# Patient Record
Sex: FEMALE | URBAN - METROPOLITAN AREA
[De-identification: names, ages, dates, MRNs, and addresses within clinical notes are randomized per-mention and may not be internally consistent; named-entity substitution may affect disease eponyms.]

---

## 2021-05-15 ENCOUNTER — HISTORICAL (OUTPATIENT)
Dept: ADMINISTRATIVE | Facility: HOSPITAL | Age: 81
End: 2021-05-15

## 2021-05-15 LAB
ABS NEUT (OLG): 2.35 X10(3)/MCL (ref 2.1–9.2)
ALBUMIN SERPL-MCNC: 3.1 GM/DL (ref 3.4–4.8)
ALBUMIN/GLOB SERPL: 1.1 RATIO (ref 1.1–2)
ALP SERPL-CCNC: 101 UNIT/L (ref 40–150)
ALT SERPL-CCNC: 13 UNIT/L (ref 0–55)
AST SERPL-CCNC: 26 UNIT/L (ref 5–34)
BASOPHILS # BLD AUTO: 0.02 X10(3)/MCL (ref 0–0.2)
BASOPHILS NFR BLD AUTO: 0.5 % (ref 0–1)
BILIRUB SERPL-MCNC: 0.5 MG/DL (ref 0.2–1.2)
BILIRUBIN DIRECT+TOT PNL SERPL-MCNC: 0.2 MG/DL (ref 0–0.5)
BILIRUBIN DIRECT+TOT PNL SERPL-MCNC: 0.3 MG/DL (ref 0–0.8)
BUN SERPL-MCNC: 16.5 MG/DL (ref 9.8–20.1)
CALCIUM SERPL-MCNC: 9.4 MG/DL (ref 8.4–10.2)
CHLORIDE SERPL-SCNC: 108 MMOL/L (ref 98–107)
CHOLEST SERPL-MCNC: 162 MG/DL
CHOLEST/HDLC SERPL: 3 {RATIO} (ref 0–5)
CO2 SERPL-SCNC: 26 MMOL/L (ref 23–31)
CREAT SERPL-MCNC: 0.81 MG/DL (ref 0.57–1.11)
EOSINOPHIL # BLD AUTO: 0.05 X10(3)/MCL (ref 0–0.9)
EOSINOPHIL NFR BLD AUTO: 1.2 % (ref 0–6.4)
ERYTHROCYTE [DISTWIDTH] IN BLOOD BY AUTOMATED COUNT: 13.8 % (ref 11.5–17)
GLOBULIN SER-MCNC: 2.9 GM/DL (ref 2.4–3.5)
GLUCOSE SERPL-MCNC: 66 MG/DL (ref 82–115)
HCT VFR BLD AUTO: 35.1 % (ref 37–47)
HDLC SERPL-MCNC: 52 MG/DL (ref 40–60)
HGB BLD-MCNC: 11.1 GM/DL (ref 12–16)
IMM GRANULOCYTES # BLD AUTO: 0.01 10*3/UL (ref 0–0.02)
IMM GRANULOCYTES NFR BLD AUTO: 0.2 % (ref 0–0.43)
LDLC SERPL CALC-MCNC: 96 MG/DL (ref 50–140)
LYMPHOCYTES # BLD AUTO: 1.35 X10(3)/MCL (ref 0.6–4.6)
LYMPHOCYTES NFR BLD AUTO: 32.2 % (ref 16–44)
MCH RBC QN AUTO: 27.7 PG (ref 27–31)
MCHC RBC AUTO-ENTMCNC: 31.6 GM/DL (ref 33–36)
MCV RBC AUTO: 87.5 FL (ref 80–94)
MONOCYTES # BLD AUTO: 0.41 X10(3)/MCL (ref 0.1–1.3)
MONOCYTES NFR BLD AUTO: 9.8 % (ref 4–12.1)
NEUTROPHILS # BLD AUTO: 2.35 X10(3)/MCL (ref 2.1–9.2)
NEUTROPHILS NFR BLD AUTO: 56.1 % (ref 43–73)
NRBC BLD AUTO-RTO: 0 % (ref 0–0.2)
PLATELET # BLD AUTO: 232 X10(3)/MCL (ref 130–400)
PMV BLD AUTO: 11 FL (ref 7.4–10.4)
POTASSIUM SERPL-SCNC: 4.3 MMOL/L (ref 3.5–5.1)
PREALB SERPL-MCNC: 15 MG/DL (ref 14–37)
PROT SERPL-MCNC: 6 GM/DL (ref 5.8–7.6)
RBC # BLD AUTO: 4.01 X10(6)/MCL (ref 4.2–5.4)
SODIUM SERPL-SCNC: 143 MMOL/L (ref 136–145)
TRIGL SERPL-MCNC: 68 MG/DL (ref 0–150)
VLDLC SERPL CALC-MCNC: 14 MG/DL
WBC # SPEC AUTO: 4.2 X10(3)/MCL (ref 4.5–11.5)

## 2021-11-18 ENCOUNTER — HISTORICAL (OUTPATIENT)
Dept: ADMINISTRATIVE | Facility: HOSPITAL | Age: 81
End: 2021-11-18

## 2021-11-18 LAB
ABS NEUT (OLG): 2.32 X10(3)/MCL (ref 2.1–9.2)
ALBUMIN SERPL-MCNC: 3 GM/DL (ref 3.4–4.8)
ALBUMIN/GLOB SERPL: 1.2 RATIO (ref 1.1–2)
ALP SERPL-CCNC: 82 UNIT/L (ref 40–150)
ALT SERPL-CCNC: 10 UNIT/L (ref 0–55)
AST SERPL-CCNC: 17 UNIT/L (ref 5–34)
BASOPHILS # BLD AUTO: 0.03 X10(3)/MCL (ref 0–0.2)
BASOPHILS NFR BLD AUTO: 0.7 % (ref 0–1)
BILIRUB SERPL-MCNC: 0.3 MG/DL (ref 0.2–1.2)
BILIRUBIN DIRECT+TOT PNL SERPL-MCNC: 0.1 MG/DL (ref 0–0.5)
BILIRUBIN DIRECT+TOT PNL SERPL-MCNC: 0.2 MG/DL (ref 0–0.8)
BUN SERPL-MCNC: 20.4 MG/DL (ref 9.8–20.1)
CALCIUM SERPL-MCNC: 9 MG/DL (ref 8.4–10.2)
CHLORIDE SERPL-SCNC: 108 MMOL/L (ref 98–107)
CHOLEST SERPL-MCNC: 131 MG/DL
CHOLEST/HDLC SERPL: 3 {RATIO} (ref 0–5)
CO2 SERPL-SCNC: 26 MMOL/L (ref 23–31)
CREAT SERPL-MCNC: 0.81 MG/DL (ref 0.57–1.11)
EOSINOPHIL # BLD AUTO: 0.12 X10(3)/MCL (ref 0–0.9)
EOSINOPHIL NFR BLD AUTO: 2.7 % (ref 0–6.4)
ERYTHROCYTE [DISTWIDTH] IN BLOOD BY AUTOMATED COUNT: 14.1 % (ref 11.5–17)
GLOBULIN SER-MCNC: 2.6 GM/DL (ref 2.4–3.5)
GLUCOSE SERPL-MCNC: 68 MG/DL (ref 82–115)
HCT VFR BLD AUTO: 33 % (ref 37–47)
HDLC SERPL-MCNC: 52 MG/DL (ref 40–60)
HGB BLD-MCNC: 10.6 GM/DL (ref 12–16)
IMM GRANULOCYTES # BLD AUTO: 0 10*3/UL (ref 0–0.02)
IMM GRANULOCYTES NFR BLD AUTO: 0 % (ref 0–0.43)
LDLC SERPL CALC-MCNC: 71 MG/DL (ref 50–140)
LYMPHOCYTES # BLD AUTO: 1.58 X10(3)/MCL (ref 0.6–4.6)
LYMPHOCYTES NFR BLD AUTO: 35.6 % (ref 16–44)
MCH RBC QN AUTO: 27.1 PG (ref 27–31)
MCHC RBC AUTO-ENTMCNC: 32.1 GM/DL (ref 33–36)
MCV RBC AUTO: 84.4 FL (ref 80–94)
MONOCYTES # BLD AUTO: 0.39 X10(3)/MCL (ref 0.1–1.3)
MONOCYTES NFR BLD AUTO: 8.8 % (ref 4–12.1)
NEUTROPHILS # BLD AUTO: 2.32 X10(3)/MCL (ref 2.1–9.2)
NEUTROPHILS NFR BLD AUTO: 52.2 % (ref 43–73)
NRBC BLD AUTO-RTO: 0 % (ref 0–0.2)
PLATELET # BLD AUTO: 172 X10(3)/MCL (ref 130–400)
PMV BLD AUTO: 10.3 FL (ref 7.4–10.4)
POTASSIUM SERPL-SCNC: 4.2 MMOL/L (ref 3.5–5.1)
PROT SERPL-MCNC: 5.6 GM/DL (ref 5.8–7.6)
RBC # BLD AUTO: 3.91 X10(6)/MCL (ref 4.2–5.4)
SODIUM SERPL-SCNC: 143 MMOL/L (ref 136–145)
TRIGL SERPL-MCNC: 39 MG/DL (ref 0–150)
VLDLC SERPL CALC-MCNC: 8 MG/DL
WBC # SPEC AUTO: 4.4 X10(3)/MCL (ref 4.5–11.5)

## 2022-05-13 ENCOUNTER — LAB REQUISITION (OUTPATIENT)
Dept: LAB | Facility: HOSPITAL | Age: 82
End: 2022-05-13
Payer: MEDICARE

## 2022-05-13 DIAGNOSIS — I10 ESSENTIAL (PRIMARY) HYPERTENSION: ICD-10-CM

## 2022-05-13 DIAGNOSIS — E44.1 MILD PROTEIN-CALORIE MALNUTRITION: ICD-10-CM

## 2022-05-13 DIAGNOSIS — R63.0 ANOREXIA: ICD-10-CM

## 2022-05-13 DIAGNOSIS — R13.10 DYSPHAGIA, UNSPECIFIED: ICD-10-CM

## 2022-05-13 LAB
ALBUMIN SERPL-MCNC: 3.2 GM/DL (ref 3.4–4.8)
ALBUMIN/GLOB SERPL: 1.1 RATIO (ref 1.1–2)
ALP SERPL-CCNC: 92 UNIT/L (ref 40–150)
ALT SERPL-CCNC: 7 UNIT/L (ref 0–55)
AST SERPL-CCNC: 18 UNIT/L (ref 5–34)
BASOPHILS # BLD AUTO: 0.02 X10(3)/MCL (ref 0–0.2)
BASOPHILS NFR BLD AUTO: 0.5 %
BILIRUBIN DIRECT+TOT PNL SERPL-MCNC: 0.3 MG/DL
BUN SERPL-MCNC: 22.3 MG/DL (ref 9.8–20.1)
CALCIUM SERPL-MCNC: 9.3 MG/DL (ref 8.4–10.2)
CHLORIDE SERPL-SCNC: 108 MMOL/L (ref 98–107)
CHOLEST SERPL-MCNC: 142 MG/DL
CHOLEST/HDLC SERPL: 3 {RATIO} (ref 0–5)
CO2 SERPL-SCNC: 26 MMOL/L (ref 23–31)
CREAT SERPL-MCNC: 0.82 MG/DL (ref 0.55–1.02)
EOSINOPHIL # BLD AUTO: 0.13 X10(3)/MCL (ref 0–0.9)
EOSINOPHIL NFR BLD AUTO: 3 %
ERYTHROCYTE [DISTWIDTH] IN BLOOD BY AUTOMATED COUNT: 13.9 % (ref 11.5–17)
GLOBULIN SER-MCNC: 2.9 GM/DL (ref 2.4–3.5)
GLUCOSE SERPL-MCNC: 68 MG/DL (ref 82–115)
HCT VFR BLD AUTO: 36.5 % (ref 37–47)
HDLC SERPL-MCNC: 54 MG/DL (ref 35–60)
HGB BLD-MCNC: 11.4 GM/DL (ref 12–16)
IMM GRANULOCYTES # BLD AUTO: 0.01 X10(3)/MCL (ref 0–0.02)
IMM GRANULOCYTES NFR BLD AUTO: 0.2 % (ref 0–0.43)
LDLC SERPL CALC-MCNC: 78 MG/DL (ref 50–140)
LYMPHOCYTES # BLD AUTO: 1.38 X10(3)/MCL (ref 0.6–4.6)
LYMPHOCYTES NFR BLD AUTO: 31.6 %
MCH RBC QN AUTO: 27.7 PG (ref 27–31)
MCHC RBC AUTO-ENTMCNC: 31.2 MG/DL (ref 33–36)
MCV RBC AUTO: 88.6 FL (ref 80–94)
MONOCYTES # BLD AUTO: 0.51 X10(3)/MCL (ref 0.1–1.3)
MONOCYTES NFR BLD AUTO: 11.7 %
NEUTROPHILS # BLD AUTO: 2.3 X10(3)/MCL (ref 2.1–9.2)
NEUTROPHILS NFR BLD AUTO: 53 %
NRBC BLD AUTO-RTO: 0 %
PLATELET # BLD AUTO: 188 X10(3)/MCL (ref 130–400)
PMV BLD AUTO: 10.6 FL (ref 9.4–12.4)
POTASSIUM SERPL-SCNC: 4.3 MMOL/L (ref 3.5–5.1)
PREALB SERPL-MCNC: 18.2 MG/DL (ref 14–37)
PROT SERPL-MCNC: 6.1 GM/DL (ref 5.8–7.6)
RBC # BLD AUTO: 4.12 X10(6)/MCL (ref 4.2–5.4)
SODIUM SERPL-SCNC: 145 MMOL/L (ref 136–145)
TRIGL SERPL-MCNC: 50 MG/DL (ref 37–140)
VLDLC SERPL CALC-MCNC: 10 MG/DL
WBC # SPEC AUTO: 4.4 X10(3)/MCL (ref 4.5–11.5)

## 2022-05-13 PROCEDURE — 80053 COMPREHEN METABOLIC PANEL: CPT | Performed by: FAMILY MEDICINE

## 2022-05-13 PROCEDURE — 80061 LIPID PANEL: CPT | Performed by: FAMILY MEDICINE

## 2022-05-13 PROCEDURE — 84134 ASSAY OF PREALBUMIN: CPT | Performed by: FAMILY MEDICINE

## 2022-05-13 PROCEDURE — 85025 COMPLETE CBC W/AUTO DIFF WBC: CPT | Performed by: FAMILY MEDICINE

## 2022-11-01 ENCOUNTER — LAB REQUISITION (OUTPATIENT)
Dept: LAB | Facility: HOSPITAL | Age: 82
End: 2022-11-01
Payer: MEDICARE

## 2022-11-01 DIAGNOSIS — I10 ESSENTIAL (PRIMARY) HYPERTENSION: ICD-10-CM

## 2022-11-01 LAB
ALBUMIN SERPL-MCNC: 3.1 GM/DL (ref 3.4–4.8)
ALBUMIN/GLOB SERPL: 1 RATIO (ref 1.1–2)
ALP SERPL-CCNC: 102 UNIT/L (ref 40–150)
ALT SERPL-CCNC: 9 UNIT/L (ref 0–55)
AST SERPL-CCNC: 17 UNIT/L (ref 5–34)
BASOPHILS # BLD AUTO: 0.03 X10(3)/MCL (ref 0–0.2)
BASOPHILS NFR BLD AUTO: 0.6 %
BILIRUBIN DIRECT+TOT PNL SERPL-MCNC: 0.2 MG/DL
BUN SERPL-MCNC: 16.9 MG/DL (ref 9.8–20.1)
CALCIUM SERPL-MCNC: 9 MG/DL (ref 8.4–10.2)
CHLORIDE SERPL-SCNC: 110 MMOL/L (ref 98–107)
CHOLEST SERPL-MCNC: 130 MG/DL
CHOLEST/HDLC SERPL: 3 {RATIO} (ref 0–5)
CO2 SERPL-SCNC: 28 MMOL/L (ref 23–31)
CREAT SERPL-MCNC: 0.84 MG/DL (ref 0.55–1.02)
EOSINOPHIL # BLD AUTO: 0.14 X10(3)/MCL (ref 0–0.9)
EOSINOPHIL NFR BLD AUTO: 2.7 %
ERYTHROCYTE [DISTWIDTH] IN BLOOD BY AUTOMATED COUNT: 13.4 % (ref 11.5–17)
GFR SERPLBLD CREATININE-BSD FMLA CKD-EPI: >60 MLS/MIN/1.73/M2
GLOBULIN SER-MCNC: 3 GM/DL (ref 2.4–3.5)
GLUCOSE SERPL-MCNC: 69 MG/DL (ref 82–115)
HCT VFR BLD AUTO: 35.4 % (ref 37–47)
HDLC SERPL-MCNC: 48 MG/DL (ref 35–60)
HGB BLD-MCNC: 11.1 GM/DL (ref 12–16)
IMM GRANULOCYTES # BLD AUTO: 0.01 X10(3)/MCL (ref 0–0.04)
IMM GRANULOCYTES NFR BLD AUTO: 0.2 %
LDLC SERPL CALC-MCNC: 73 MG/DL (ref 50–140)
LYMPHOCYTES # BLD AUTO: 1.6 X10(3)/MCL (ref 0.6–4.6)
LYMPHOCYTES NFR BLD AUTO: 31.3 %
MCH RBC QN AUTO: 27.8 PG (ref 27–31)
MCHC RBC AUTO-ENTMCNC: 31.4 MG/DL (ref 33–36)
MCV RBC AUTO: 88.5 FL (ref 80–94)
MONOCYTES # BLD AUTO: 0.59 X10(3)/MCL (ref 0.1–1.3)
MONOCYTES NFR BLD AUTO: 11.5 %
NEUTROPHILS # BLD AUTO: 2.8 X10(3)/MCL (ref 2.1–9.2)
NEUTROPHILS NFR BLD AUTO: 53.7 %
NRBC BLD AUTO-RTO: 0 %
PLATELET # BLD AUTO: 229 X10(3)/MCL (ref 130–400)
PMV BLD AUTO: 10.6 FL (ref 7.4–10.4)
POTASSIUM SERPL-SCNC: 4.4 MMOL/L (ref 3.5–5.1)
PROT SERPL-MCNC: 6.1 GM/DL (ref 5.8–7.6)
RBC # BLD AUTO: 4 X10(6)/MCL (ref 4.2–5.4)
SODIUM SERPL-SCNC: 145 MMOL/L (ref 136–145)
TRIGL SERPL-MCNC: 44 MG/DL (ref 37–140)
VLDLC SERPL CALC-MCNC: 9 MG/DL
WBC # SPEC AUTO: 5.1 X10(3)/MCL (ref 4.5–11.5)

## 2022-11-01 PROCEDURE — 85025 COMPLETE CBC W/AUTO DIFF WBC: CPT | Performed by: INTERNAL MEDICINE

## 2022-11-01 PROCEDURE — 80061 LIPID PANEL: CPT | Performed by: INTERNAL MEDICINE

## 2022-11-01 PROCEDURE — 80053 COMPREHEN METABOLIC PANEL: CPT | Performed by: INTERNAL MEDICINE

## 2023-05-01 ENCOUNTER — LAB REQUISITION (OUTPATIENT)
Dept: LAB | Facility: HOSPITAL | Age: 83
End: 2023-05-01
Payer: MEDICARE

## 2023-05-01 DIAGNOSIS — E78.5 HYPERLIPIDEMIA, UNSPECIFIED: ICD-10-CM

## 2023-05-01 DIAGNOSIS — I10 ESSENTIAL (PRIMARY) HYPERTENSION: ICD-10-CM

## 2023-05-01 LAB
ALBUMIN SERPL-MCNC: 3.2 G/DL (ref 3.4–4.8)
ALBUMIN/GLOB SERPL: 1 RATIO (ref 1.1–2)
ALP SERPL-CCNC: 97 UNIT/L (ref 40–150)
ALT SERPL-CCNC: 18 UNIT/L (ref 0–55)
AST SERPL-CCNC: 36 UNIT/L (ref 5–34)
BASOPHILS # BLD AUTO: 0.02 X10(3)/MCL (ref 0–0.2)
BASOPHILS NFR BLD AUTO: 0.7 %
BILIRUBIN DIRECT+TOT PNL SERPL-MCNC: 0.2 MG/DL
BUN SERPL-MCNC: 20.6 MG/DL (ref 9.8–20.1)
CALCIUM SERPL-MCNC: 9 MG/DL (ref 8.4–10.2)
CHLORIDE SERPL-SCNC: 110 MMOL/L (ref 98–107)
CHOLEST SERPL-MCNC: 157 MG/DL
CHOLEST/HDLC SERPL: 3 {RATIO} (ref 0–5)
CO2 SERPL-SCNC: 27 MMOL/L (ref 23–31)
CREAT SERPL-MCNC: 0.89 MG/DL (ref 0.55–1.02)
EOSINOPHIL # BLD AUTO: 0.06 X10(3)/MCL (ref 0–0.9)
EOSINOPHIL NFR BLD AUTO: 2 %
ERYTHROCYTE [DISTWIDTH] IN BLOOD BY AUTOMATED COUNT: 14.3 % (ref 11.5–17)
GFR SERPLBLD CREATININE-BSD FMLA CKD-EPI: >60 MLS/MIN/1.73/M2
GLOBULIN SER-MCNC: 3.3 GM/DL (ref 2.4–3.5)
GLUCOSE SERPL-MCNC: 69 MG/DL (ref 82–115)
HCT VFR BLD AUTO: 39.4 % (ref 37–47)
HDLC SERPL-MCNC: 51 MG/DL (ref 35–60)
HGB BLD-MCNC: 12.1 G/DL (ref 12–16)
IMM GRANULOCYTES # BLD AUTO: 0.01 X10(3)/MCL (ref 0–0.04)
IMM GRANULOCYTES NFR BLD AUTO: 0.3 %
LDLC SERPL CALC-MCNC: 92 MG/DL (ref 50–140)
LYMPHOCYTES # BLD AUTO: 1.24 X10(3)/MCL (ref 0.6–4.6)
LYMPHOCYTES NFR BLD AUTO: 41.3 %
MCH RBC QN AUTO: 27.3 PG (ref 27–31)
MCHC RBC AUTO-ENTMCNC: 30.7 G/DL (ref 33–36)
MCV RBC AUTO: 88.7 FL (ref 80–94)
MONOCYTES # BLD AUTO: 0.38 X10(3)/MCL (ref 0.1–1.3)
MONOCYTES NFR BLD AUTO: 12.7 %
NEUTROPHILS # BLD AUTO: 1.29 X10(3)/MCL (ref 2.1–9.2)
NEUTROPHILS NFR BLD AUTO: 43 %
NRBC BLD AUTO-RTO: 0 %
PLATELET # BLD AUTO: 208 X10(3)/MCL (ref 130–400)
PMV BLD AUTO: 11 FL (ref 7.4–10.4)
POTASSIUM SERPL-SCNC: 4 MMOL/L (ref 3.5–5.1)
PROT SERPL-MCNC: 6.5 GM/DL (ref 5.8–7.6)
RBC # BLD AUTO: 4.44 X10(6)/MCL (ref 4.2–5.4)
SODIUM SERPL-SCNC: 147 MMOL/L (ref 136–145)
TRIGL SERPL-MCNC: 72 MG/DL (ref 37–140)
VLDLC SERPL CALC-MCNC: 14 MG/DL
WBC # SPEC AUTO: 3 X10(3)/MCL (ref 4.5–11.5)

## 2023-05-01 PROCEDURE — 80053 COMPREHEN METABOLIC PANEL: CPT | Performed by: INTERNAL MEDICINE

## 2023-05-01 PROCEDURE — 80061 LIPID PANEL: CPT | Performed by: INTERNAL MEDICINE

## 2023-05-01 PROCEDURE — 85025 COMPLETE CBC W/AUTO DIFF WBC: CPT | Performed by: INTERNAL MEDICINE

## 2023-11-02 ENCOUNTER — LAB REQUISITION (OUTPATIENT)
Dept: LAB | Facility: HOSPITAL | Age: 83
End: 2023-11-02
Payer: MEDICARE

## 2023-11-02 DIAGNOSIS — I10 ESSENTIAL (PRIMARY) HYPERTENSION: ICD-10-CM

## 2023-11-02 LAB
ALBUMIN SERPL-MCNC: 3.3 G/DL (ref 3.4–4.8)
ALBUMIN/GLOB SERPL: 1 RATIO (ref 1.1–2)
ALP SERPL-CCNC: 106 UNIT/L (ref 40–150)
ALT SERPL-CCNC: 10 UNIT/L (ref 0–55)
AST SERPL-CCNC: 21 UNIT/L (ref 5–34)
BASOPHILS # BLD AUTO: 0.04 X10(3)/MCL
BASOPHILS NFR BLD AUTO: 0.8 %
BILIRUB SERPL-MCNC: 0.2 MG/DL
BUN SERPL-MCNC: 18.9 MG/DL (ref 9.8–20.1)
CALCIUM SERPL-MCNC: 8.9 MG/DL (ref 8.4–10.2)
CHLORIDE SERPL-SCNC: 111 MMOL/L (ref 98–107)
CHOLEST SERPL-MCNC: 144 MG/DL
CHOLEST/HDLC SERPL: 3 {RATIO} (ref 0–5)
CO2 SERPL-SCNC: 25 MMOL/L (ref 23–31)
CREAT SERPL-MCNC: 1.03 MG/DL (ref 0.55–1.02)
EOSINOPHIL # BLD AUTO: 0.17 X10(3)/MCL (ref 0–0.9)
EOSINOPHIL NFR BLD AUTO: 3.3 %
ERYTHROCYTE [DISTWIDTH] IN BLOOD BY AUTOMATED COUNT: 13.6 % (ref 11.5–17)
GFR SERPLBLD CREATININE-BSD FMLA CKD-EPI: 55 MLS/MIN/1.73/M2
GLOBULIN SER-MCNC: 3.2 GM/DL (ref 2.4–3.5)
GLUCOSE SERPL-MCNC: 59 MG/DL (ref 82–115)
HCT VFR BLD AUTO: 37.8 % (ref 37–47)
HDLC SERPL-MCNC: 42 MG/DL (ref 35–60)
HGB BLD-MCNC: 12 G/DL (ref 12–16)
IMM GRANULOCYTES # BLD AUTO: 0 X10(3)/MCL (ref 0–0.04)
IMM GRANULOCYTES NFR BLD AUTO: 0 %
LDLC SERPL CALC-MCNC: 79 MG/DL (ref 50–140)
LYMPHOCYTES # BLD AUTO: 1.7 X10(3)/MCL (ref 0.6–4.6)
LYMPHOCYTES NFR BLD AUTO: 32.8 %
MCH RBC QN AUTO: 28.4 PG (ref 27–31)
MCHC RBC AUTO-ENTMCNC: 31.7 G/DL (ref 33–36)
MCV RBC AUTO: 89.6 FL (ref 80–94)
MONOCYTES # BLD AUTO: 0.45 X10(3)/MCL (ref 0.1–1.3)
MONOCYTES NFR BLD AUTO: 8.7 %
NEUTROPHILS # BLD AUTO: 2.83 X10(3)/MCL (ref 2.1–9.2)
NEUTROPHILS NFR BLD AUTO: 54.4 %
NRBC BLD AUTO-RTO: 0 %
PLATELET # BLD AUTO: 207 X10(3)/MCL (ref 130–400)
PMV BLD AUTO: 10.9 FL (ref 7.4–10.4)
POTASSIUM SERPL-SCNC: 4.7 MMOL/L (ref 3.5–5.1)
PROT SERPL-MCNC: 6.5 GM/DL (ref 5.8–7.6)
RBC # BLD AUTO: 4.22 X10(6)/MCL (ref 4.2–5.4)
SODIUM SERPL-SCNC: 144 MMOL/L (ref 136–145)
TRIGL SERPL-MCNC: 116 MG/DL (ref 37–140)
VLDLC SERPL CALC-MCNC: 23 MG/DL
WBC # SPEC AUTO: 5.19 X10(3)/MCL (ref 4.5–11.5)

## 2023-11-02 PROCEDURE — 85025 COMPLETE CBC W/AUTO DIFF WBC: CPT | Performed by: INTERNAL MEDICINE

## 2023-11-02 PROCEDURE — 80053 COMPREHEN METABOLIC PANEL: CPT | Performed by: INTERNAL MEDICINE

## 2023-11-02 PROCEDURE — 80061 LIPID PANEL: CPT | Performed by: INTERNAL MEDICINE

## 2024-05-01 ENCOUNTER — LAB REQUISITION (OUTPATIENT)
Dept: LAB | Facility: HOSPITAL | Age: 84
End: 2024-05-01
Payer: MEDICARE

## 2024-05-01 DIAGNOSIS — E78.5 HYPERLIPIDEMIA, UNSPECIFIED: ICD-10-CM

## 2024-05-01 DIAGNOSIS — I10 ESSENTIAL (PRIMARY) HYPERTENSION: ICD-10-CM

## 2024-05-01 DIAGNOSIS — E44.1 MILD PROTEIN-CALORIE MALNUTRITION: ICD-10-CM

## 2024-05-01 LAB
ALBUMIN SERPL-MCNC: 3.2 G/DL (ref 3.4–4.8)
ALBUMIN/GLOB SERPL: 1.1 RATIO (ref 1.1–2)
ALP SERPL-CCNC: 98 UNIT/L (ref 40–150)
ALT SERPL-CCNC: 5 UNIT/L (ref 0–55)
AST SERPL-CCNC: 18 UNIT/L (ref 5–34)
BASOPHILS # BLD AUTO: 0.04 X10(3)/MCL
BASOPHILS NFR BLD AUTO: 0.8 %
BILIRUB SERPL-MCNC: 0.2 MG/DL
BUN SERPL-MCNC: 18.2 MG/DL (ref 9.8–20.1)
CALCIUM SERPL-MCNC: 8.8 MG/DL (ref 8.4–10.2)
CHLORIDE SERPL-SCNC: 109 MMOL/L (ref 98–107)
CHOLEST SERPL-MCNC: 131 MG/DL
CHOLEST/HDLC SERPL: 3 {RATIO} (ref 0–5)
CO2 SERPL-SCNC: 26 MMOL/L (ref 23–31)
CREAT SERPL-MCNC: 1.07 MG/DL (ref 0.55–1.02)
EOSINOPHIL # BLD AUTO: 0.14 X10(3)/MCL (ref 0–0.9)
EOSINOPHIL NFR BLD AUTO: 2.9 %
ERYTHROCYTE [DISTWIDTH] IN BLOOD BY AUTOMATED COUNT: 13.8 % (ref 11.5–17)
GFR SERPLBLD CREATININE-BSD FMLA CKD-EPI: 52 MLS/MIN/1.73/M2
GLOBULIN SER-MCNC: 3 GM/DL (ref 2.4–3.5)
GLUCOSE SERPL-MCNC: 64 MG/DL (ref 82–115)
HCT VFR BLD AUTO: 34.6 % (ref 37–47)
HDLC SERPL-MCNC: 51 MG/DL (ref 35–60)
HGB BLD-MCNC: 10.7 G/DL (ref 12–16)
IMM GRANULOCYTES # BLD AUTO: 0.01 X10(3)/MCL (ref 0–0.04)
IMM GRANULOCYTES NFR BLD AUTO: 0.2 %
LDLC SERPL CALC-MCNC: 67 MG/DL (ref 50–140)
LYMPHOCYTES # BLD AUTO: 1.58 X10(3)/MCL (ref 0.6–4.6)
LYMPHOCYTES NFR BLD AUTO: 32.2 %
MCH RBC QN AUTO: 27.2 PG (ref 27–31)
MCHC RBC AUTO-ENTMCNC: 30.9 G/DL (ref 33–36)
MCV RBC AUTO: 88 FL (ref 80–94)
MONOCYTES # BLD AUTO: 0.52 X10(3)/MCL (ref 0.1–1.3)
MONOCYTES NFR BLD AUTO: 10.6 %
NEUTROPHILS # BLD AUTO: 2.62 X10(3)/MCL (ref 2.1–9.2)
NEUTROPHILS NFR BLD AUTO: 53.3 %
NRBC BLD AUTO-RTO: 0 %
PLATELET # BLD AUTO: 219 X10(3)/MCL (ref 130–400)
PMV BLD AUTO: 10.9 FL (ref 7.4–10.4)
POTASSIUM SERPL-SCNC: 4.3 MMOL/L (ref 3.5–5.1)
PREALB SERPL-MCNC: 17.4 MG/DL (ref 14–37)
PROT SERPL-MCNC: 6.2 GM/DL (ref 5.8–7.6)
RBC # BLD AUTO: 3.93 X10(6)/MCL (ref 4.2–5.4)
SODIUM SERPL-SCNC: 144 MMOL/L (ref 136–145)
TRIGL SERPL-MCNC: 66 MG/DL (ref 37–140)
VLDLC SERPL CALC-MCNC: 13 MG/DL
WBC # SPEC AUTO: 4.91 X10(3)/MCL (ref 4.5–11.5)

## 2024-05-01 PROCEDURE — 80053 COMPREHEN METABOLIC PANEL: CPT | Performed by: INTERNAL MEDICINE

## 2024-05-01 PROCEDURE — 80061 LIPID PANEL: CPT | Performed by: INTERNAL MEDICINE

## 2024-05-01 PROCEDURE — 85025 COMPLETE CBC W/AUTO DIFF WBC: CPT | Performed by: INTERNAL MEDICINE

## 2024-05-01 PROCEDURE — 84134 ASSAY OF PREALBUMIN: CPT | Performed by: INTERNAL MEDICINE

## 2024-06-12 PROCEDURE — 87086 URINE CULTURE/COLONY COUNT: CPT | Performed by: NURSE PRACTITIONER

## 2024-06-12 PROCEDURE — 87077 CULTURE AEROBIC IDENTIFY: CPT | Mod: 91 | Performed by: NURSE PRACTITIONER

## 2024-06-12 PROCEDURE — 81003 URINALYSIS AUTO W/O SCOPE: CPT | Performed by: NURSE PRACTITIONER

## 2024-06-13 ENCOUNTER — LAB REQUISITION (OUTPATIENT)
Dept: LAB | Facility: HOSPITAL | Age: 84
End: 2024-06-13
Payer: MEDICARE

## 2024-06-13 DIAGNOSIS — R41.0 DISORIENTATION, UNSPECIFIED: ICD-10-CM

## 2024-06-13 LAB
BACTERIA #/AREA URNS AUTO: ABNORMAL /HPF
BILIRUB UR QL STRIP.AUTO: NEGATIVE
CLARITY UR: ABNORMAL
COLOR UR AUTO: ABNORMAL
GLUCOSE UR QL STRIP: NEGATIVE
HGB UR QL STRIP: NEGATIVE
KETONES UR QL STRIP: NEGATIVE
LEUKOCYTE ESTERASE UR QL STRIP: ABNORMAL
NITRITE UR QL STRIP: POSITIVE
PH UR STRIP: 6 [PH]
PROT UR QL STRIP: NEGATIVE
RBC #/AREA URNS AUTO: ABNORMAL /HPF
SP GR UR STRIP.AUTO: 1.02 (ref 1–1.03)
SQUAMOUS #/AREA URNS AUTO: ABNORMAL /HPF
UROBILINOGEN UR STRIP-ACNC: 0.2
WBC #/AREA URNS AUTO: ABNORMAL /HPF

## 2024-06-16 LAB — BACTERIA UR CULT: ABNORMAL

## 2024-07-23 ENCOUNTER — LAB REQUISITION (OUTPATIENT)
Dept: LAB | Facility: HOSPITAL | Age: 84
End: 2024-07-23
Payer: MEDICARE

## 2024-07-23 DIAGNOSIS — Z02.2 ENCOUNTER FOR EXAMINATION FOR ADMISSION TO RESIDENTIAL INSTITUTION: ICD-10-CM

## 2024-07-23 LAB
ALBUMIN SERPL-MCNC: 3.1 G/DL (ref 3.4–4.8)
ALBUMIN/GLOB SERPL: 1 RATIO (ref 1.1–2)
ALP SERPL-CCNC: 84 UNIT/L (ref 40–150)
ALT SERPL-CCNC: 14 UNIT/L (ref 0–55)
ANION GAP SERPL CALC-SCNC: 11 MEQ/L
AST SERPL-CCNC: 25 UNIT/L (ref 5–34)
BASOPHILS # BLD AUTO: 0.03 X10(3)/MCL
BASOPHILS NFR BLD AUTO: 0.6 %
BILIRUB SERPL-MCNC: 0.4 MG/DL
BUN SERPL-MCNC: 20 MG/DL (ref 9.8–20.1)
CALCIUM SERPL-MCNC: 9.8 MG/DL (ref 8.4–10.2)
CHLORIDE SERPL-SCNC: 111 MMOL/L (ref 98–107)
CHOLEST SERPL-MCNC: 120 MG/DL
CHOLEST/HDLC SERPL: 3 {RATIO} (ref 0–5)
CO2 SERPL-SCNC: 26 MMOL/L (ref 23–31)
CREAT SERPL-MCNC: 1.04 MG/DL (ref 0.55–1.02)
CREAT/UREA NIT SERPL: 19
EOSINOPHIL # BLD AUTO: 0.12 X10(3)/MCL (ref 0–0.9)
EOSINOPHIL NFR BLD AUTO: 2.4 %
ERYTHROCYTE [DISTWIDTH] IN BLOOD BY AUTOMATED COUNT: 14.1 % (ref 11.5–17)
EST. AVERAGE GLUCOSE BLD GHB EST-MCNC: 108.3 MG/DL
GFR SERPLBLD CREATININE-BSD FMLA CKD-EPI: 54 ML/MIN/1.73/M2
GLOBULIN SER-MCNC: 3 GM/DL (ref 2.4–3.5)
GLUCOSE SERPL-MCNC: 76 MG/DL (ref 82–115)
HBA1C MFR BLD: 5.4 %
HCT VFR BLD AUTO: 33.9 % (ref 37–47)
HDLC SERPL-MCNC: 40 MG/DL (ref 35–60)
HGB BLD-MCNC: 10.6 G/DL (ref 12–16)
IMM GRANULOCYTES # BLD AUTO: 0.02 X10(3)/MCL (ref 0–0.04)
IMM GRANULOCYTES NFR BLD AUTO: 0.4 %
LDLC SERPL CALC-MCNC: 65 MG/DL (ref 50–140)
LYMPHOCYTES # BLD AUTO: 0.99 X10(3)/MCL (ref 0.6–4.6)
LYMPHOCYTES NFR BLD AUTO: 20 %
MCH RBC QN AUTO: 27.7 PG (ref 27–31)
MCHC RBC AUTO-ENTMCNC: 31.3 G/DL (ref 33–36)
MCV RBC AUTO: 88.7 FL (ref 80–94)
MONOCYTES # BLD AUTO: 0.42 X10(3)/MCL (ref 0.1–1.3)
MONOCYTES NFR BLD AUTO: 8.5 %
NEUTROPHILS # BLD AUTO: 3.38 X10(3)/MCL (ref 2.1–9.2)
NEUTROPHILS NFR BLD AUTO: 68.1 %
NRBC BLD AUTO-RTO: 0 %
PLATELET # BLD AUTO: 216 X10(3)/MCL (ref 130–400)
PMV BLD AUTO: 10.6 FL (ref 7.4–10.4)
POTASSIUM SERPL-SCNC: 4.4 MMOL/L (ref 3.5–5.1)
PREALB SERPL-MCNC: 13.3 MG/DL (ref 14–37)
PROT SERPL-MCNC: 6.1 GM/DL (ref 5.8–7.6)
RBC # BLD AUTO: 3.82 X10(6)/MCL (ref 4.2–5.4)
SODIUM SERPL-SCNC: 148 MMOL/L (ref 136–145)
TRIGL SERPL-MCNC: 73 MG/DL (ref 37–140)
VLDLC SERPL CALC-MCNC: 15 MG/DL
WBC # BLD AUTO: 4.96 X10(3)/MCL (ref 4.5–11.5)

## 2024-07-23 PROCEDURE — 80061 LIPID PANEL: CPT | Performed by: INTERNAL MEDICINE

## 2024-07-23 PROCEDURE — 80053 COMPREHEN METABOLIC PANEL: CPT | Performed by: INTERNAL MEDICINE

## 2024-07-23 PROCEDURE — 84134 ASSAY OF PREALBUMIN: CPT | Performed by: INTERNAL MEDICINE

## 2024-07-23 PROCEDURE — 85025 COMPLETE CBC W/AUTO DIFF WBC: CPT | Performed by: INTERNAL MEDICINE

## 2024-07-23 PROCEDURE — 83036 HEMOGLOBIN GLYCOSYLATED A1C: CPT | Performed by: INTERNAL MEDICINE

## 2024-07-26 ENCOUNTER — LAB REQUISITION (OUTPATIENT)
Dept: LAB | Facility: HOSPITAL | Age: 84
End: 2024-07-26
Payer: MEDICARE

## 2024-07-26 DIAGNOSIS — E87.0 HYPEROSMOLALITY AND HYPERNATREMIA: ICD-10-CM

## 2024-07-26 LAB
ALBUMIN SERPL-MCNC: 3.1 G/DL (ref 3.4–4.8)
ALBUMIN/GLOB SERPL: 1 RATIO (ref 1.1–2)
ALP SERPL-CCNC: 88 UNIT/L (ref 40–150)
ALT SERPL-CCNC: 14 UNIT/L (ref 0–55)
ANION GAP SERPL CALC-SCNC: 12 MEQ/L
AST SERPL-CCNC: 23 UNIT/L (ref 5–34)
BILIRUB SERPL-MCNC: 0.2 MG/DL
BUN SERPL-MCNC: 16.6 MG/DL (ref 9.8–20.1)
CALCIUM SERPL-MCNC: 9.6 MG/DL (ref 8.4–10.2)
CHLORIDE SERPL-SCNC: 110 MMOL/L (ref 98–107)
CO2 SERPL-SCNC: 26 MMOL/L (ref 23–31)
CREAT SERPL-MCNC: 1.03 MG/DL (ref 0.55–1.02)
CREAT/UREA NIT SERPL: 16
GFR SERPLBLD CREATININE-BSD FMLA CKD-EPI: 54 ML/MIN/1.73/M2
GLOBULIN SER-MCNC: 3.1 GM/DL (ref 2.4–3.5)
GLUCOSE SERPL-MCNC: 64 MG/DL (ref 82–115)
POTASSIUM SERPL-SCNC: 4.1 MMOL/L (ref 3.5–5.1)
PROT SERPL-MCNC: 6.2 GM/DL (ref 5.8–7.6)
SODIUM SERPL-SCNC: 148 MMOL/L (ref 136–145)

## 2024-07-26 PROCEDURE — 80053 COMPREHEN METABOLIC PANEL: CPT | Performed by: NURSE PRACTITIONER

## 2024-07-27 ENCOUNTER — LAB REQUISITION (OUTPATIENT)
Dept: LAB | Facility: HOSPITAL | Age: 84
End: 2024-07-27
Payer: MEDICARE

## 2024-07-27 DIAGNOSIS — E86.0 DEHYDRATION: ICD-10-CM

## 2024-07-27 LAB
ALBUMIN SERPL-MCNC: 3.1 G/DL (ref 3.4–4.8)
ALBUMIN/GLOB SERPL: 1 RATIO (ref 1.1–2)
ALP SERPL-CCNC: 100 UNIT/L (ref 40–150)
ALT SERPL-CCNC: 13 UNIT/L (ref 0–55)
ANION GAP SERPL CALC-SCNC: 10 MEQ/L
AST SERPL-CCNC: 21 UNIT/L (ref 5–34)
BILIRUB SERPL-MCNC: 0.2 MG/DL
BUN SERPL-MCNC: 10.5 MG/DL (ref 9.8–20.1)
CALCIUM SERPL-MCNC: 9.4 MG/DL (ref 8.4–10.2)
CHLORIDE SERPL-SCNC: 109 MMOL/L (ref 98–107)
CO2 SERPL-SCNC: 25 MMOL/L (ref 23–31)
CREAT SERPL-MCNC: 0.97 MG/DL (ref 0.55–1.02)
CREAT/UREA NIT SERPL: 11
GFR SERPLBLD CREATININE-BSD FMLA CKD-EPI: 58 ML/MIN/1.73/M2
GLOBULIN SER-MCNC: 3.1 GM/DL (ref 2.4–3.5)
GLUCOSE SERPL-MCNC: 153 MG/DL (ref 82–115)
POTASSIUM SERPL-SCNC: 3.9 MMOL/L (ref 3.5–5.1)
PROT SERPL-MCNC: 6.2 GM/DL (ref 5.8–7.6)
SODIUM SERPL-SCNC: 144 MMOL/L (ref 136–145)

## 2024-07-27 PROCEDURE — 80053 COMPREHEN METABOLIC PANEL: CPT | Performed by: NURSE PRACTITIONER

## 2024-07-31 ENCOUNTER — LAB REQUISITION (OUTPATIENT)
Dept: LAB | Facility: HOSPITAL | Age: 84
End: 2024-07-31
Payer: MEDICARE

## 2024-07-31 DIAGNOSIS — E86.1 HYPOVOLEMIA: ICD-10-CM

## 2024-07-31 LAB
ALBUMIN SERPL-MCNC: 2.9 G/DL (ref 3.4–4.8)
ALBUMIN/GLOB SERPL: 1 RATIO (ref 1.1–2)
ALP SERPL-CCNC: 92 UNIT/L (ref 40–150)
ALT SERPL-CCNC: 9 UNIT/L (ref 0–55)
ANION GAP SERPL CALC-SCNC: 10 MEQ/L
AST SERPL-CCNC: 17 UNIT/L (ref 5–34)
BILIRUB SERPL-MCNC: 0.2 MG/DL
BUN SERPL-MCNC: 12.8 MG/DL (ref 9.8–20.1)
CALCIUM SERPL-MCNC: 9 MG/DL (ref 8.4–10.2)
CHLORIDE SERPL-SCNC: 111 MMOL/L (ref 98–107)
CO2 SERPL-SCNC: 26 MMOL/L (ref 23–31)
CREAT SERPL-MCNC: 0.96 MG/DL (ref 0.55–1.02)
CREAT/UREA NIT SERPL: 13
GFR SERPLBLD CREATININE-BSD FMLA CKD-EPI: 59 ML/MIN/1.73/M2
GLOBULIN SER-MCNC: 2.9 GM/DL (ref 2.4–3.5)
GLUCOSE SERPL-MCNC: 78 MG/DL (ref 82–115)
POTASSIUM SERPL-SCNC: 3.9 MMOL/L (ref 3.5–5.1)
PROT SERPL-MCNC: 5.8 GM/DL (ref 5.8–7.6)
SODIUM SERPL-SCNC: 147 MMOL/L (ref 136–145)

## 2024-07-31 PROCEDURE — 80053 COMPREHEN METABOLIC PANEL: CPT | Performed by: NURSE PRACTITIONER

## 2024-08-02 ENCOUNTER — LAB REQUISITION (OUTPATIENT)
Dept: LAB | Facility: HOSPITAL | Age: 84
End: 2024-08-02
Payer: MEDICARE

## 2024-08-02 DIAGNOSIS — E86.1 HYPOVOLEMIA: ICD-10-CM

## 2024-08-02 DIAGNOSIS — E87.1 HYPO-OSMOLALITY AND HYPONATREMIA: ICD-10-CM

## 2024-08-02 LAB
ALBUMIN SERPL-MCNC: 2.9 G/DL (ref 3.4–4.8)
ALBUMIN/GLOB SERPL: 1 RATIO (ref 1.1–2)
ALP SERPL-CCNC: 90 UNIT/L (ref 40–150)
ALT SERPL-CCNC: 10 UNIT/L (ref 0–55)
ANION GAP SERPL CALC-SCNC: 12 MEQ/L
AST SERPL-CCNC: 18 UNIT/L (ref 5–34)
BILIRUB SERPL-MCNC: 0.2 MG/DL
BUN SERPL-MCNC: 10.1 MG/DL (ref 9.8–20.1)
CALCIUM SERPL-MCNC: 9 MG/DL (ref 8.4–10.2)
CHLORIDE SERPL-SCNC: 106 MMOL/L (ref 98–107)
CO2 SERPL-SCNC: 25 MMOL/L (ref 23–31)
CREAT SERPL-MCNC: 0.92 MG/DL (ref 0.55–1.02)
CREAT/UREA NIT SERPL: 11
GFR SERPLBLD CREATININE-BSD FMLA CKD-EPI: >60 ML/MIN/1.73/M2
GLOBULIN SER-MCNC: 2.8 GM/DL (ref 2.4–3.5)
GLUCOSE SERPL-MCNC: 102 MG/DL (ref 82–115)
POTASSIUM SERPL-SCNC: 3.9 MMOL/L (ref 3.5–5.1)
PROT SERPL-MCNC: 5.7 GM/DL (ref 5.8–7.6)
SODIUM SERPL-SCNC: 143 MMOL/L (ref 136–145)

## 2024-08-02 PROCEDURE — 80053 COMPREHEN METABOLIC PANEL: CPT | Performed by: NURSE PRACTITIONER

## 2024-08-21 ENCOUNTER — LAB REQUISITION (OUTPATIENT)
Dept: LAB | Facility: HOSPITAL | Age: 84
End: 2024-08-21
Payer: MEDICARE

## 2024-08-21 DIAGNOSIS — R63.0 ANOREXIA: ICD-10-CM

## 2024-08-21 LAB
ALBUMIN SERPL-MCNC: 2.3 G/DL (ref 3.4–4.8)
ALBUMIN/GLOB SERPL: 0.5 RATIO (ref 1.1–2)
ALP SERPL-CCNC: 82 UNIT/L (ref 40–150)
ALT SERPL-CCNC: 27 UNIT/L (ref 0–55)
ANION GAP SERPL CALC-SCNC: 14 MEQ/L
AST SERPL-CCNC: 29 UNIT/L (ref 5–34)
BASOPHILS # BLD AUTO: 0.02 X10(3)/MCL
BASOPHILS NFR BLD AUTO: 0.2 %
BILIRUB SERPL-MCNC: 0.3 MG/DL
BUN SERPL-MCNC: 19.7 MG/DL (ref 9.8–20.1)
CALCIUM SERPL-MCNC: 9.6 MG/DL (ref 8.4–10.2)
CHLORIDE SERPL-SCNC: 112 MMOL/L (ref 98–107)
CO2 SERPL-SCNC: 23 MMOL/L (ref 23–31)
CREAT SERPL-MCNC: 1.06 MG/DL (ref 0.55–1.02)
CREAT/UREA NIT SERPL: 19
EOSINOPHIL # BLD AUTO: 0.1 X10(3)/MCL (ref 0–0.9)
EOSINOPHIL NFR BLD AUTO: 0.9 %
ERYTHROCYTE [DISTWIDTH] IN BLOOD BY AUTOMATED COUNT: 14.1 % (ref 11.5–17)
GFR SERPLBLD CREATININE-BSD FMLA CKD-EPI: 53 ML/MIN/1.73/M2
GLOBULIN SER-MCNC: 4.9 GM/DL (ref 2.4–3.5)
GLUCOSE SERPL-MCNC: 84 MG/DL (ref 82–115)
HCT VFR BLD AUTO: 31 % (ref 37–47)
HGB BLD-MCNC: 9.8 G/DL (ref 12–16)
IMM GRANULOCYTES # BLD AUTO: 0.07 X10(3)/MCL (ref 0–0.04)
IMM GRANULOCYTES NFR BLD AUTO: 0.7 %
LYMPHOCYTES # BLD AUTO: 1.08 X10(3)/MCL (ref 0.6–4.6)
LYMPHOCYTES NFR BLD AUTO: 10.2 %
MCH RBC QN AUTO: 27.6 PG (ref 27–31)
MCHC RBC AUTO-ENTMCNC: 31.6 G/DL (ref 33–36)
MCV RBC AUTO: 87.3 FL (ref 80–94)
MONOCYTES # BLD AUTO: 0.89 X10(3)/MCL (ref 0.1–1.3)
MONOCYTES NFR BLD AUTO: 8.4 %
NEUTROPHILS # BLD AUTO: 8.41 X10(3)/MCL (ref 2.1–9.2)
NEUTROPHILS NFR BLD AUTO: 79.6 %
NRBC BLD AUTO-RTO: 0 %
PLATELET # BLD AUTO: 393 X10(3)/MCL (ref 130–400)
PMV BLD AUTO: 10 FL (ref 7.4–10.4)
POTASSIUM SERPL-SCNC: 4.1 MMOL/L (ref 3.5–5.1)
PROT SERPL-MCNC: 7.2 GM/DL (ref 5.8–7.6)
RBC # BLD AUTO: 3.55 X10(6)/MCL (ref 4.2–5.4)
SODIUM SERPL-SCNC: 149 MMOL/L (ref 136–145)
WBC # BLD AUTO: 10.57 X10(3)/MCL (ref 4.5–11.5)

## 2024-08-21 PROCEDURE — 85025 COMPLETE CBC W/AUTO DIFF WBC: CPT | Performed by: NURSE PRACTITIONER

## 2024-08-21 PROCEDURE — 80053 COMPREHEN METABOLIC PANEL: CPT | Performed by: NURSE PRACTITIONER

## 2024-08-23 ENCOUNTER — LAB REQUISITION (OUTPATIENT)
Dept: LAB | Facility: HOSPITAL | Age: 84
End: 2024-08-23
Payer: MEDICARE

## 2024-08-23 DIAGNOSIS — E86.0 DEHYDRATION: ICD-10-CM

## 2024-08-23 LAB
ALBUMIN SERPL-MCNC: 2 G/DL (ref 3.4–4.8)
ALBUMIN/GLOB SERPL: 0.6 RATIO (ref 1.1–2)
ALP SERPL-CCNC: 62 UNIT/L (ref 40–150)
ALT SERPL-CCNC: 21 UNIT/L (ref 0–55)
ANION GAP SERPL CALC-SCNC: 10 MEQ/L
AST SERPL-CCNC: 21 UNIT/L (ref 5–34)
BASOPHILS # BLD AUTO: 0.02 X10(3)/MCL
BASOPHILS NFR BLD AUTO: 0.2 %
BILIRUB SERPL-MCNC: 0.2 MG/DL
BUN SERPL-MCNC: 13.4 MG/DL (ref 9.8–20.1)
CALCIUM SERPL-MCNC: 8.1 MG/DL (ref 8.4–10.2)
CHLORIDE SERPL-SCNC: 107 MMOL/L (ref 98–107)
CO2 SERPL-SCNC: 23 MMOL/L (ref 23–31)
CREAT SERPL-MCNC: 0.88 MG/DL (ref 0.55–1.02)
CREAT/UREA NIT SERPL: 15
EOSINOPHIL # BLD AUTO: 0.19 X10(3)/MCL (ref 0–0.9)
EOSINOPHIL NFR BLD AUTO: 2 %
ERYTHROCYTE [DISTWIDTH] IN BLOOD BY AUTOMATED COUNT: 14 % (ref 11.5–17)
GFR SERPLBLD CREATININE-BSD FMLA CKD-EPI: >60 ML/MIN/1.73/M2
GLOBULIN SER-MCNC: 3.3 GM/DL (ref 2.4–3.5)
GLUCOSE SERPL-MCNC: 113 MG/DL (ref 82–115)
HCT VFR BLD AUTO: 27.3 % (ref 37–47)
HGB BLD-MCNC: 8.7 G/DL (ref 12–16)
IMM GRANULOCYTES # BLD AUTO: 0.05 X10(3)/MCL (ref 0–0.04)
IMM GRANULOCYTES NFR BLD AUTO: 0.5 %
LYMPHOCYTES # BLD AUTO: 1.07 X10(3)/MCL (ref 0.6–4.6)
LYMPHOCYTES NFR BLD AUTO: 11.4 %
MCH RBC QN AUTO: 27.8 PG (ref 27–31)
MCHC RBC AUTO-ENTMCNC: 31.9 G/DL (ref 33–36)
MCV RBC AUTO: 87.2 FL (ref 80–94)
MONOCYTES # BLD AUTO: 0.82 X10(3)/MCL (ref 0.1–1.3)
MONOCYTES NFR BLD AUTO: 8.7 %
NEUTROPHILS # BLD AUTO: 7.24 X10(3)/MCL (ref 2.1–9.2)
NEUTROPHILS NFR BLD AUTO: 77.2 %
NRBC BLD AUTO-RTO: 0 %
PLATELET # BLD AUTO: 335 X10(3)/MCL (ref 130–400)
PMV BLD AUTO: 9.7 FL (ref 7.4–10.4)
POTASSIUM SERPL-SCNC: 4.4 MMOL/L (ref 3.5–5.1)
PROT SERPL-MCNC: 5.3 GM/DL (ref 5.8–7.6)
RBC # BLD AUTO: 3.13 X10(6)/MCL (ref 4.2–5.4)
SODIUM SERPL-SCNC: 140 MMOL/L (ref 136–145)
WBC # BLD AUTO: 9.39 X10(3)/MCL (ref 4.5–11.5)

## 2024-08-23 PROCEDURE — 85025 COMPLETE CBC W/AUTO DIFF WBC: CPT | Performed by: NURSE PRACTITIONER

## 2024-08-23 PROCEDURE — 80053 COMPREHEN METABOLIC PANEL: CPT | Performed by: NURSE PRACTITIONER

## 2024-08-26 ENCOUNTER — LAB REQUISITION (OUTPATIENT)
Dept: LAB | Facility: HOSPITAL | Age: 84
End: 2024-08-26
Payer: MEDICARE

## 2024-08-26 DIAGNOSIS — E86.1 HYPOVOLEMIA: ICD-10-CM

## 2024-08-26 DIAGNOSIS — E87.0 HYPEROSMOLALITY AND HYPERNATREMIA: ICD-10-CM

## 2024-08-26 LAB
ALBUMIN SERPL-MCNC: 2.2 G/DL (ref 3.4–4.8)
ALBUMIN/GLOB SERPL: 0.6 RATIO (ref 1.1–2)
ALP SERPL-CCNC: 64 UNIT/L (ref 40–150)
ALT SERPL-CCNC: 22 UNIT/L (ref 0–55)
ANION GAP SERPL CALC-SCNC: 12 MEQ/L
AST SERPL-CCNC: 27 UNIT/L (ref 5–34)
BASOPHILS # BLD AUTO: 0.04 X10(3)/MCL
BASOPHILS NFR BLD AUTO: 0.3 %
BILIRUB SERPL-MCNC: 0.2 MG/DL
BUN SERPL-MCNC: 17.9 MG/DL (ref 9.8–20.1)
CALCIUM SERPL-MCNC: 8.5 MG/DL (ref 8.4–10.2)
CHLORIDE SERPL-SCNC: 110 MMOL/L (ref 98–107)
CO2 SERPL-SCNC: 21 MMOL/L (ref 23–31)
CREAT SERPL-MCNC: 0.87 MG/DL (ref 0.55–1.02)
CREAT/UREA NIT SERPL: 21
EOSINOPHIL # BLD AUTO: 0.16 X10(3)/MCL (ref 0–0.9)
EOSINOPHIL NFR BLD AUTO: 1.4 %
ERYTHROCYTE [DISTWIDTH] IN BLOOD BY AUTOMATED COUNT: 13.7 % (ref 11.5–17)
GFR SERPLBLD CREATININE-BSD FMLA CKD-EPI: >60 ML/MIN/1.73/M2
GLOBULIN SER-MCNC: 3.9 GM/DL (ref 2.4–3.5)
GLUCOSE SERPL-MCNC: 82 MG/DL (ref 82–115)
HCT VFR BLD AUTO: 30.5 % (ref 37–47)
HGB BLD-MCNC: 9.8 G/DL (ref 12–16)
IMM GRANULOCYTES # BLD AUTO: 0.07 X10(3)/MCL (ref 0–0.04)
IMM GRANULOCYTES NFR BLD AUTO: 0.6 %
LYMPHOCYTES # BLD AUTO: 0.96 X10(3)/MCL (ref 0.6–4.6)
LYMPHOCYTES NFR BLD AUTO: 8.3 %
MCH RBC QN AUTO: 27.2 PG (ref 27–31)
MCHC RBC AUTO-ENTMCNC: 32.1 G/DL (ref 33–36)
MCV RBC AUTO: 84.7 FL (ref 80–94)
MONOCYTES # BLD AUTO: 1.02 X10(3)/MCL (ref 0.1–1.3)
MONOCYTES NFR BLD AUTO: 8.8 %
NEUTROPHILS # BLD AUTO: 9.31 X10(3)/MCL (ref 2.1–9.2)
NEUTROPHILS NFR BLD AUTO: 80.6 %
NRBC BLD AUTO-RTO: 0 %
PLATELET # BLD AUTO: 453 X10(3)/MCL (ref 130–400)
PMV BLD AUTO: 9.7 FL (ref 7.4–10.4)
POTASSIUM SERPL-SCNC: 4.8 MMOL/L (ref 3.5–5.1)
PROT SERPL-MCNC: 6.1 GM/DL (ref 5.8–7.6)
RBC # BLD AUTO: 3.6 X10(6)/MCL (ref 4.2–5.4)
SODIUM SERPL-SCNC: 143 MMOL/L (ref 136–145)
WBC # BLD AUTO: 11.56 X10(3)/MCL (ref 4.5–11.5)

## 2024-08-26 PROCEDURE — 80053 COMPREHEN METABOLIC PANEL: CPT | Performed by: NURSE PRACTITIONER

## 2024-08-26 PROCEDURE — 85025 COMPLETE CBC W/AUTO DIFF WBC: CPT | Performed by: NURSE PRACTITIONER

## 2024-08-28 ENCOUNTER — LAB REQUISITION (OUTPATIENT)
Dept: LAB | Facility: HOSPITAL | Age: 84
End: 2024-08-28
Payer: MEDICARE

## 2024-08-28 DIAGNOSIS — D72.829 ELEVATED WHITE BLOOD CELL COUNT, UNSPECIFIED: ICD-10-CM

## 2024-08-28 LAB
ALBUMIN SERPL-MCNC: 2.2 G/DL (ref 3.4–4.8)
ALBUMIN/GLOB SERPL: 0.6 RATIO (ref 1.1–2)
ALP SERPL-CCNC: 75 UNIT/L (ref 40–150)
ALT SERPL-CCNC: 24 UNIT/L (ref 0–55)
ANION GAP SERPL CALC-SCNC: 11 MEQ/L
AST SERPL-CCNC: 29 UNIT/L (ref 5–34)
BASOPHILS # BLD AUTO: 0.03 X10(3)/MCL
BASOPHILS NFR BLD AUTO: 0.3 %
BILIRUB SERPL-MCNC: 0.3 MG/DL
BUN SERPL-MCNC: 18.5 MG/DL (ref 9.8–20.1)
CALCIUM SERPL-MCNC: 8.7 MG/DL (ref 8.4–10.2)
CHLORIDE SERPL-SCNC: 112 MMOL/L (ref 98–107)
CO2 SERPL-SCNC: 21 MMOL/L (ref 23–31)
CREAT SERPL-MCNC: 0.87 MG/DL (ref 0.55–1.02)
CREAT/UREA NIT SERPL: 21
EOSINOPHIL # BLD AUTO: 0.22 X10(3)/MCL (ref 0–0.9)
EOSINOPHIL NFR BLD AUTO: 2.1 %
ERYTHROCYTE [DISTWIDTH] IN BLOOD BY AUTOMATED COUNT: 13.8 % (ref 11.5–17)
GFR SERPLBLD CREATININE-BSD FMLA CKD-EPI: >60 ML/MIN/1.73/M2
GLOBULIN SER-MCNC: 4 GM/DL (ref 2.4–3.5)
GLUCOSE SERPL-MCNC: 90 MG/DL (ref 82–115)
HCT VFR BLD AUTO: 29.5 % (ref 37–47)
HGB BLD-MCNC: 9.5 G/DL (ref 12–16)
IMM GRANULOCYTES # BLD AUTO: 0.05 X10(3)/MCL (ref 0–0.04)
IMM GRANULOCYTES NFR BLD AUTO: 0.5 %
LYMPHOCYTES # BLD AUTO: 1.13 X10(3)/MCL (ref 0.6–4.6)
LYMPHOCYTES NFR BLD AUTO: 10.9 %
MCH RBC QN AUTO: 27.1 PG (ref 27–31)
MCHC RBC AUTO-ENTMCNC: 32.2 G/DL (ref 33–36)
MCV RBC AUTO: 84.3 FL (ref 80–94)
MONOCYTES # BLD AUTO: 0.9 X10(3)/MCL (ref 0.1–1.3)
MONOCYTES NFR BLD AUTO: 8.7 %
NEUTROPHILS # BLD AUTO: 8.07 X10(3)/MCL (ref 2.1–9.2)
NEUTROPHILS NFR BLD AUTO: 77.5 %
NRBC BLD AUTO-RTO: 0 %
PLATELET # BLD AUTO: 471 X10(3)/MCL (ref 130–400)
PMV BLD AUTO: 9.6 FL (ref 7.4–10.4)
POTASSIUM SERPL-SCNC: 4.3 MMOL/L (ref 3.5–5.1)
PROT SERPL-MCNC: 6.2 GM/DL (ref 5.8–7.6)
RBC # BLD AUTO: 3.5 X10(6)/MCL (ref 4.2–5.4)
SODIUM SERPL-SCNC: 144 MMOL/L (ref 136–145)
WBC # BLD AUTO: 10.4 X10(3)/MCL (ref 4.5–11.5)

## 2024-08-28 PROCEDURE — 80053 COMPREHEN METABOLIC PANEL: CPT | Performed by: NURSE PRACTITIONER

## 2024-08-28 PROCEDURE — 85025 COMPLETE CBC W/AUTO DIFF WBC: CPT | Performed by: NURSE PRACTITIONER

## 2024-09-06 ENCOUNTER — HOSPITAL ENCOUNTER (INPATIENT)
Facility: HOSPITAL | Age: 84
LOS: 4 days | Discharge: HOSPICE | DRG: 683 | End: 2024-09-10
Attending: STUDENT IN AN ORGANIZED HEALTH CARE EDUCATION/TRAINING PROGRAM | Admitting: INTERNAL MEDICINE
Payer: MEDICARE

## 2024-09-06 DIAGNOSIS — R00.0 TACHYCARDIA: ICD-10-CM

## 2024-09-06 DIAGNOSIS — R79.89 ELEVATED TROPONIN: ICD-10-CM

## 2024-09-06 DIAGNOSIS — R07.9 CHEST PAIN: ICD-10-CM

## 2024-09-06 DIAGNOSIS — E86.0 DEHYDRATION: Primary | ICD-10-CM

## 2024-09-06 DIAGNOSIS — N17.9 AKI (ACUTE KIDNEY INJURY): ICD-10-CM

## 2024-09-06 PROBLEM — R62.7 FAILURE TO THRIVE IN ADULT: Status: ACTIVE | Noted: 2024-09-06

## 2024-09-06 PROBLEM — F03.C0 ADVANCED DEMENTIA: Status: ACTIVE | Noted: 2024-09-06

## 2024-09-06 LAB
ALBUMIN SERPL-MCNC: 2 G/DL (ref 3.4–4.8)
ALBUMIN/GLOB SERPL: 0.4 RATIO (ref 1.1–2)
ALP SERPL-CCNC: 84 UNIT/L (ref 40–150)
ALT SERPL-CCNC: 18 UNIT/L (ref 0–55)
ANION GAP SERPL CALC-SCNC: 12 MEQ/L
APTT PPP: 22.5 SECONDS (ref 23.2–33.7)
AST SERPL-CCNC: 41 UNIT/L (ref 5–34)
BACTERIA #/AREA URNS AUTO: ABNORMAL /HPF
BASOPHILS # BLD AUTO: 0.04 X10(3)/MCL
BASOPHILS NFR BLD AUTO: 0.3 %
BILIRUB SERPL-MCNC: 0.2 MG/DL
BILIRUB UR QL STRIP.AUTO: NEGATIVE
BUN SERPL-MCNC: 60.9 MG/DL (ref 9.8–20.1)
CALCIUM SERPL-MCNC: 8.3 MG/DL (ref 8.4–10.2)
CHLORIDE SERPL-SCNC: 117 MMOL/L (ref 98–107)
CLARITY UR: ABNORMAL
CO2 SERPL-SCNC: 20 MMOL/L (ref 23–31)
COLOR UR AUTO: YELLOW
CREAT SERPL-MCNC: 1.28 MG/DL (ref 0.55–1.02)
CREAT/UREA NIT SERPL: 48
EOSINOPHIL # BLD AUTO: 0.06 X10(3)/MCL (ref 0–0.9)
EOSINOPHIL NFR BLD AUTO: 0.4 %
ERYTHROCYTE [DISTWIDTH] IN BLOOD BY AUTOMATED COUNT: 14.4 % (ref 11.5–17)
FLUAV AG UPPER RESP QL IA.RAPID: NOT DETECTED
FLUBV AG UPPER RESP QL IA.RAPID: NOT DETECTED
GFR SERPLBLD CREATININE-BSD FMLA CKD-EPI: 42 ML/MIN/1.73/M2
GLOBULIN SER-MCNC: 4.5 GM/DL (ref 2.4–3.5)
GLUCOSE SERPL-MCNC: 120 MG/DL (ref 82–115)
GLUCOSE UR QL STRIP: NORMAL
HCT VFR BLD AUTO: 31.1 % (ref 37–47)
HGB BLD-MCNC: 9.2 G/DL (ref 12–16)
HGB UR QL STRIP: NEGATIVE
IMM GRANULOCYTES # BLD AUTO: 0.12 X10(3)/MCL (ref 0–0.04)
IMM GRANULOCYTES NFR BLD AUTO: 0.8 %
INR PPP: 1.2
KETONES UR QL STRIP: NEGATIVE
LEUKOCYTE ESTERASE UR QL STRIP: 500
LYMPHOCYTES # BLD AUTO: 1.09 X10(3)/MCL (ref 0.6–4.6)
LYMPHOCYTES NFR BLD AUTO: 7.6 %
MAGNESIUM SERPL-MCNC: 2.8 MG/DL (ref 1.6–2.6)
MCH RBC QN AUTO: 26.4 PG (ref 27–31)
MCHC RBC AUTO-ENTMCNC: 29.6 G/DL (ref 33–36)
MCV RBC AUTO: 89.1 FL (ref 80–94)
MONOCYTES # BLD AUTO: 0.89 X10(3)/MCL (ref 0.1–1.3)
MONOCYTES NFR BLD AUTO: 6.2 %
MUCOUS THREADS URNS QL MICRO: ABNORMAL /LPF
NEUTROPHILS # BLD AUTO: 12.06 X10(3)/MCL (ref 2.1–9.2)
NEUTROPHILS NFR BLD AUTO: 84.7 %
NITRITE UR QL STRIP: NEGATIVE
NRBC BLD AUTO-RTO: 0 %
OHS QRS DURATION: 60 MS
OHS QTC CALCULATION: 414 MS
PH UR STRIP: 5.5 [PH]
PLATELET # BLD AUTO: 371 X10(3)/MCL (ref 130–400)
PMV BLD AUTO: 9.7 FL (ref 7.4–10.4)
POTASSIUM SERPL-SCNC: 5.1 MMOL/L (ref 3.5–5.1)
PROT SERPL-MCNC: 6.5 GM/DL (ref 5.8–7.6)
PROT UR QL STRIP: ABNORMAL
PROTHROMBIN TIME: 15.5 SECONDS (ref 12.5–14.5)
RBC # BLD AUTO: 3.49 X10(6)/MCL (ref 4.2–5.4)
RBC #/AREA URNS AUTO: ABNORMAL /HPF
RSV A 5' UTR RNA NPH QL NAA+PROBE: NOT DETECTED
SARS-COV-2 RNA RESP QL NAA+PROBE: NOT DETECTED
SODIUM SERPL-SCNC: 149 MMOL/L (ref 136–145)
SP GR UR STRIP.AUTO: 1.04 (ref 1–1.03)
SQUAMOUS #/AREA URNS LPF: ABNORMAL /HPF
TROPONIN I SERPL-MCNC: 0.13 NG/ML (ref 0–0.04)
TSH SERPL-ACNC: 1.78 UIU/ML (ref 0.35–4.94)
UROBILINOGEN UR STRIP-ACNC: 2
WBC # BLD AUTO: 14.26 X10(3)/MCL (ref 4.5–11.5)
WBC #/AREA URNS AUTO: ABNORMAL /HPF

## 2024-09-06 PROCEDURE — 85610 PROTHROMBIN TIME: CPT | Performed by: STUDENT IN AN ORGANIZED HEALTH CARE EDUCATION/TRAINING PROGRAM

## 2024-09-06 PROCEDURE — 25000242 PHARM REV CODE 250 ALT 637 W/ HCPCS: Performed by: INTERNAL MEDICINE

## 2024-09-06 PROCEDURE — 63600175 PHARM REV CODE 636 W HCPCS: Performed by: NURSE PRACTITIONER

## 2024-09-06 PROCEDURE — 11000001 HC ACUTE MED/SURG PRIVATE ROOM

## 2024-09-06 PROCEDURE — 96360 HYDRATION IV INFUSION INIT: CPT

## 2024-09-06 PROCEDURE — 87154 CUL TYP ID BLD PTHGN 6+ TRGT: CPT | Performed by: STUDENT IN AN ORGANIZED HEALTH CARE EDUCATION/TRAINING PROGRAM

## 2024-09-06 PROCEDURE — 93010 ELECTROCARDIOGRAM REPORT: CPT | Mod: ,,, | Performed by: INTERNAL MEDICINE

## 2024-09-06 PROCEDURE — 87077 CULTURE AEROBIC IDENTIFY: CPT | Performed by: STUDENT IN AN ORGANIZED HEALTH CARE EDUCATION/TRAINING PROGRAM

## 2024-09-06 PROCEDURE — 81001 URINALYSIS AUTO W/SCOPE: CPT | Performed by: STUDENT IN AN ORGANIZED HEALTH CARE EDUCATION/TRAINING PROGRAM

## 2024-09-06 PROCEDURE — 99900031 HC PATIENT EDUCATION (STAT)

## 2024-09-06 PROCEDURE — 99285 EMERGENCY DEPT VISIT HI MDM: CPT | Mod: 25

## 2024-09-06 PROCEDURE — 83735 ASSAY OF MAGNESIUM: CPT | Performed by: STUDENT IN AN ORGANIZED HEALTH CARE EDUCATION/TRAINING PROGRAM

## 2024-09-06 PROCEDURE — 0241U COVID/RSV/FLU A&B PCR: CPT | Performed by: STUDENT IN AN ORGANIZED HEALTH CARE EDUCATION/TRAINING PROGRAM

## 2024-09-06 PROCEDURE — 21400001 HC TELEMETRY ROOM

## 2024-09-06 PROCEDURE — 80053 COMPREHEN METABOLIC PANEL: CPT | Performed by: STUDENT IN AN ORGANIZED HEALTH CARE EDUCATION/TRAINING PROGRAM

## 2024-09-06 PROCEDURE — 85025 COMPLETE CBC W/AUTO DIFF WBC: CPT | Performed by: STUDENT IN AN ORGANIZED HEALTH CARE EDUCATION/TRAINING PROGRAM

## 2024-09-06 PROCEDURE — 83880 ASSAY OF NATRIURETIC PEPTIDE: CPT | Performed by: INTERNAL MEDICINE

## 2024-09-06 PROCEDURE — 87040 BLOOD CULTURE FOR BACTERIA: CPT | Performed by: STUDENT IN AN ORGANIZED HEALTH CARE EDUCATION/TRAINING PROGRAM

## 2024-09-06 PROCEDURE — 85730 THROMBOPLASTIN TIME PARTIAL: CPT | Performed by: STUDENT IN AN ORGANIZED HEALTH CARE EDUCATION/TRAINING PROGRAM

## 2024-09-06 PROCEDURE — 84484 ASSAY OF TROPONIN QUANT: CPT | Performed by: STUDENT IN AN ORGANIZED HEALTH CARE EDUCATION/TRAINING PROGRAM

## 2024-09-06 PROCEDURE — 84443 ASSAY THYROID STIM HORMONE: CPT | Performed by: STUDENT IN AN ORGANIZED HEALTH CARE EDUCATION/TRAINING PROGRAM

## 2024-09-06 PROCEDURE — 36415 COLL VENOUS BLD VENIPUNCTURE: CPT | Performed by: INTERNAL MEDICINE

## 2024-09-06 PROCEDURE — 94760 N-INVAS EAR/PLS OXIMETRY 1: CPT

## 2024-09-06 PROCEDURE — 63600175 PHARM REV CODE 636 W HCPCS: Performed by: STUDENT IN AN ORGANIZED HEALTH CARE EDUCATION/TRAINING PROGRAM

## 2024-09-06 PROCEDURE — 84484 ASSAY OF TROPONIN QUANT: CPT | Performed by: INTERNAL MEDICINE

## 2024-09-06 PROCEDURE — 94640 AIRWAY INHALATION TREATMENT: CPT

## 2024-09-06 PROCEDURE — 93005 ELECTROCARDIOGRAM TRACING: CPT

## 2024-09-06 PROCEDURE — 25000003 PHARM REV CODE 250: Performed by: INTERNAL MEDICINE

## 2024-09-06 RX ORDER — RISPERIDONE 0.25 MG/1
TABLET ORAL
COMMUNITY

## 2024-09-06 RX ORDER — SERTRALINE HYDROCHLORIDE 25 MG/1
25 TABLET, FILM COATED ORAL DAILY
COMMUNITY

## 2024-09-06 RX ORDER — FLUTICASONE PROPIONATE AND SALMETEROL 250; 50 UG/1; UG/1
1 POWDER RESPIRATORY (INHALATION) 2 TIMES DAILY
COMMUNITY

## 2024-09-06 RX ORDER — NALOXONE HCL 0.4 MG/ML
0.02 VIAL (ML) INJECTION
Status: DISCONTINUED | OUTPATIENT
Start: 2024-09-06 | End: 2024-09-06

## 2024-09-06 RX ORDER — ONDANSETRON HYDROCHLORIDE 2 MG/ML
4 INJECTION, SOLUTION INTRAVENOUS EVERY 4 HOURS PRN
Status: DISCONTINUED | OUTPATIENT
Start: 2024-09-06 | End: 2024-09-10 | Stop reason: HOSPADM

## 2024-09-06 RX ORDER — DEXTROSE MONOHYDRATE 50 MG/ML
INJECTION, SOLUTION INTRAVENOUS CONTINUOUS
Status: DISCONTINUED | OUTPATIENT
Start: 2024-09-06 | End: 2024-09-07

## 2024-09-06 RX ORDER — ACETAMINOPHEN 500 MG
1000 TABLET ORAL EVERY 6 HOURS PRN
Status: DISCONTINUED | OUTPATIENT
Start: 2024-09-06 | End: 2024-09-10 | Stop reason: HOSPADM

## 2024-09-06 RX ORDER — PROCHLORPERAZINE EDISYLATE 5 MG/ML
5 INJECTION INTRAMUSCULAR; INTRAVENOUS EVERY 6 HOURS PRN
Status: DISCONTINUED | OUTPATIENT
Start: 2024-09-06 | End: 2024-09-10 | Stop reason: HOSPADM

## 2024-09-06 RX ORDER — MEMANTINE HYDROCHLORIDE 5 MG/1
5 TABLET ORAL 2 TIMES DAILY
COMMUNITY

## 2024-09-06 RX ORDER — HEPARIN SODIUM 5000 [USP'U]/ML
5000 INJECTION, SOLUTION INTRAVENOUS; SUBCUTANEOUS EVERY 12 HOURS
Status: DISCONTINUED | OUTPATIENT
Start: 2024-09-06 | End: 2024-09-06

## 2024-09-06 RX ORDER — SODIUM CHLORIDE 0.9 % (FLUSH) 0.9 %
10 SYRINGE (ML) INJECTION
Status: DISCONTINUED | OUTPATIENT
Start: 2024-09-06 | End: 2024-09-10 | Stop reason: HOSPADM

## 2024-09-06 RX ORDER — MEGESTROL ACETATE 125 MG/ML
625 SUSPENSION ORAL DAILY
COMMUNITY

## 2024-09-06 RX ORDER — SODIUM CHLORIDE, SODIUM LACTATE, POTASSIUM CHLORIDE, CALCIUM CHLORIDE 600; 310; 30; 20 MG/100ML; MG/100ML; MG/100ML; MG/100ML
INJECTION, SOLUTION INTRAVENOUS CONTINUOUS
Status: DISCONTINUED | OUTPATIENT
Start: 2024-09-06 | End: 2024-09-06

## 2024-09-06 RX ORDER — IPRATROPIUM BROMIDE AND ALBUTEROL SULFATE 2.5; .5 MG/3ML; MG/3ML
3 SOLUTION RESPIRATORY (INHALATION) EVERY 4 HOURS PRN
Status: DISCONTINUED | OUTPATIENT
Start: 2024-09-06 | End: 2024-09-10 | Stop reason: HOSPADM

## 2024-09-06 RX ORDER — ASCORBIC ACID 500 MG
500 TABLET ORAL DAILY
COMMUNITY

## 2024-09-06 RX ORDER — MONTELUKAST SODIUM 10 MG/1
10 TABLET ORAL NIGHTLY
COMMUNITY

## 2024-09-06 RX ORDER — DIPHENHYDRAMINE HYDROCHLORIDE 50 MG/ML
25 INJECTION INTRAMUSCULAR; INTRAVENOUS EVERY 6 HOURS PRN
Status: DISCONTINUED | OUTPATIENT
Start: 2024-09-06 | End: 2024-09-10 | Stop reason: HOSPADM

## 2024-09-06 RX ORDER — LOSARTAN POTASSIUM 50 MG/1
50 TABLET ORAL DAILY
Status: ON HOLD | COMMUNITY
End: 2024-09-10

## 2024-09-06 RX ORDER — GLUCAGON 1 MG
1 KIT INJECTION
Status: DISCONTINUED | OUTPATIENT
Start: 2024-09-06 | End: 2024-09-06

## 2024-09-06 RX ORDER — ZINC GLUCONATE 50 MG
50 TABLET ORAL DAILY
COMMUNITY

## 2024-09-06 RX ORDER — IBUPROFEN 200 MG
16 TABLET ORAL
Status: DISCONTINUED | OUTPATIENT
Start: 2024-09-06 | End: 2024-09-06

## 2024-09-06 RX ORDER — HALOPERIDOL 5 MG/ML
5 INJECTION INTRAMUSCULAR EVERY 6 HOURS PRN
Status: DISCONTINUED | OUTPATIENT
Start: 2024-09-06 | End: 2024-09-10 | Stop reason: HOSPADM

## 2024-09-06 RX ORDER — ACETAMINOPHEN 325 MG/1
650 TABLET ORAL EVERY 4 HOURS PRN
Status: DISCONTINUED | OUTPATIENT
Start: 2024-09-06 | End: 2024-09-10 | Stop reason: HOSPADM

## 2024-09-06 RX ORDER — NUT.TX.GLUCOSE INTOLERANCE,SOY
BAR ORAL
COMMUNITY

## 2024-09-06 RX ORDER — MIRTAZAPINE 15 MG/1
7.5 TABLET, ORALLY DISINTEGRATING ORAL NIGHTLY
COMMUNITY

## 2024-09-06 RX ORDER — ATORVASTATIN CALCIUM 10 MG/1
10 TABLET, FILM COATED ORAL DAILY
COMMUNITY

## 2024-09-06 RX ORDER — IBUPROFEN 200 MG
24 TABLET ORAL
Status: DISCONTINUED | OUTPATIENT
Start: 2024-09-06 | End: 2024-09-06

## 2024-09-06 RX ORDER — RIVASTIGMINE 9.5 MG/24H
1 PATCH, EXTENDED RELEASE TRANSDERMAL DAILY
COMMUNITY

## 2024-09-06 RX ORDER — SENNOSIDES 8.6 MG/1
2 TABLET ORAL DAILY
COMMUNITY

## 2024-09-06 RX ADMIN — SODIUM CHLORIDE, POTASSIUM CHLORIDE, SODIUM LACTATE AND CALCIUM CHLORIDE: 600; 310; 30; 20 INJECTION, SOLUTION INTRAVENOUS at 10:09

## 2024-09-06 RX ADMIN — SODIUM CHLORIDE, POTASSIUM CHLORIDE, SODIUM LACTATE AND CALCIUM CHLORIDE 1000 ML: 600; 310; 30; 20 INJECTION, SOLUTION INTRAVENOUS at 07:09

## 2024-09-06 RX ADMIN — DEXTROSE MONOHYDRATE: 50 INJECTION, SOLUTION INTRAVENOUS at 11:09

## 2024-09-06 RX ADMIN — IPRATROPIUM BROMIDE AND ALBUTEROL SULFATE 3 ML: 2.5; .5 SOLUTION RESPIRATORY (INHALATION) at 11:09

## 2024-09-06 NOTE — Clinical Note
Diagnosis: Tachycardia [741932]   Future Attending Provider: CHRISTIANA RODRIGUEZ [[id number]]   Admit to which facility:: [facility] [[id number]]   Reason for IP Medical Treatment  (Clinical interventions that can only be accomplished in the IP setting? ) :: Dehydration, elevated troponin

## 2024-09-06 NOTE — ED PROVIDER NOTES
Encounter Date: 9/6/2024    SCRIBE #1 NOTE: I, Mart Meeks, am scribing for, and in the presence of,  Greg Gill MD. I have scribed the following portions of the note - Other sections scribed: HPI, ROS, PE.       History     Chief Complaint   Patient presents with    Hyperventilating     Presents from [facility] for tachycardia and tachypnea. Patient has no complaints. NH reports decreased oral intake and requesting a PEG tube. GCS 14, baseline.     84 year old female with a pmhx of asthma, dementia, depression, HLD, and HTN presents to the ED from [facility] for tachycardia and tachypnea onset PTA.  Per triage, the patient's nursing home is requesting a PEG tube due to decreased oral intake.  Upon examination, the patient complains of being cold.  A full history and review of symptoms is limited due to the patient's condition.    The history is provided by the nursing home and medical records. The history is limited by the condition of the patient. No  was used.     Review of patient's allergies indicates:   Allergen Reactions    Peanut      No past medical history on file.  No past surgical history on file.  No family history on file.     Review of Systems   Unable to perform ROS: Acuity of condition       Physical Exam     Initial Vitals [09/06/24 1730]   BP Pulse Resp Temp SpO2   135/75 (!) 117 (!) 26 98.1 °F (36.7 °C) 97 %      MAP       --         Physical Exam    Nursing note and vitals reviewed.  Constitutional: She appears well-developed. She is not diaphoretic. No distress.   Thin   HENT:   Head: Normocephalic and atraumatic.   Right Ear: External ear normal.   Left Ear: External ear normal.   Nose: Nose normal.   Mouth/Throat: Mucous membranes are dry.   Temporal wasting   Eyes: Conjunctivae and EOM are normal. Pupils are equal, round, and reactive to light. Right eye exhibits no discharge. Left eye exhibits no discharge.   Cardiovascular:  Normal rate, regular  rhythm, normal heart sounds and intact distal pulses.     Exam reveals no gallop and no friction rub.       No murmur heard.  Pulses:       Radial pulses are 2+ on the right side and 2+ on the left side.        Dorsalis pedis pulses are 2+ on the right side and 2+ on the left side.   Pulmonary/Chest: No respiratory distress. She has no rhonchi. She has no rales. She exhibits no tenderness.   Mild wheezing bilaterally   Abdominal: Abdomen is soft. Bowel sounds are normal. She exhibits no distension and no mass. There is no abdominal tenderness. There is no rebound and no guarding.   Musculoskeletal:         General: No edema.     Neurological:   Pt complains of being cold, otherwise speaking incoherently   Skin: Skin is warm and dry. Capillary refill takes less than 2 seconds. No erythema. No pallor.   Sacral wound          ED Course   Procedures  Labs Reviewed   MAGNESIUM - Abnormal       Result Value    Magnesium Level 2.80 (*)    PROTIME-INR - Abnormal    PT 15.5 (*)     INR 1.2     TROPONIN I - Abnormal    Troponin-I 0.127 (*)    URINALYSIS, REFLEX TO URINE CULTURE - Abnormal    Color, UA Yellow      Appearance, UA Turbid (*)     Specific Gravity, UA 1.037 (*)     pH, UA 5.5      Protein, UA Trace (*)     Glucose, UA Normal      Ketones, UA Negative      Blood, UA Negative      Bilirubin, UA Negative      Urobilinogen, UA 2.0 (*)     Nitrites, UA Negative      Leukocyte Esterase,  (*)     RBC, UA 0-5      WBC, UA 0-5      Bacteria, UA None Seen      Squamous Epithelial Cells, UA Moderate (*)     Mucous, UA Trace (*)    APTT - Abnormal    PTT 22.5 (*)    COMPREHENSIVE METABOLIC PANEL - Abnormal    Sodium 149 (*)     Potassium 5.1      Chloride 117 (*)     CO2 20 (*)     Glucose 120 (*)     Blood Urea Nitrogen 60.9 (*)     Creatinine 1.28 (*)     Calcium 8.3 (*)     Protein Total 6.5      Albumin 2.0 (*)     Globulin 4.5 (*)     Albumin/Globulin Ratio 0.4 (*)     Bilirubin Total 0.2      ALP 84      ALT 18       AST 41 (*)     eGFR 42      Anion Gap 12.0      BUN/Creatinine Ratio 48     CBC WITH DIFFERENTIAL - Abnormal    WBC 14.26 (*)     RBC 3.49 (*)     Hgb 9.2 (*)     Hct 31.1 (*)     MCV 89.1      MCH 26.4 (*)     MCHC 29.6 (*)     RDW 14.4      Platelet 371      MPV 9.7      Neut % 84.7      Lymph % 7.6      Mono % 6.2      Eos % 0.4      Basophil % 0.3      Lymph # 1.09      Neut # 12.06 (*)     Mono # 0.89      Eos # 0.06      Baso # 0.04      IG# 0.12 (*)     IG% 0.8      NRBC% 0.0     TSH - Normal    TSH 1.783     COVID/RSV/FLU A&B PCR - Normal    Influenza A PCR Not Detected      Influenza B PCR Not Detected      Respiratory Syncytial Virus PCR Not Detected      SARS-CoV-2 PCR Not Detected      Narrative:     The Xpert Xpress SARS-CoV-2/FLU/RSV plus is a rapid, multiplexed real-time PCR test intended for the simultaneous qualitative detection and differentiation of SARS-CoV-2, Influenza A, Influenza B, and respiratory syncytial virus (RSV) viral RNA in either nasopharyngeal swab or nasal swab specimens.         BLOOD CULTURE [facility]   BLOOD CULTURE [facility]   CBC W/ AUTO DIFFERENTIAL    Narrative:     The following orders were created for panel order CBC auto differential.  Procedure                               Abnormality         Status                     ---------                               -----------         ------                     CBC with Differential[[id number]]       Abnormal            Final result                 Please view results for these tests on the individual orders.        ECG Results              EKG 12-lead (Final result)        Collection Time Result Time QRS Duration OHS QTC Calculation    09/06/24 17:48:04 09/06/24 18:57:01 60 414                     Final result by Interface, Lab In [facility] (09/06/24 18:57:08)                   Narrative:    Test Reason : R00.0,    Vent. Rate : 109 BPM     Atrial Rate : 109 BPM     P-R Int : 166 ms          QRS Dur : 060 ms      QT Int : 308 ms        P-R-T Axes : 034 -18 055 degrees     QTc Int : 414 ms    Sinus tachycardia  Otherwise normal ECG  No previous ECGs available  Confirmed by Javier Jordan MD ([id number]) on 9/6/2024 6:56:58 PM    Referred By:             Confirmed By:Javier Jordan MD                                  Imaging Results              X-Ray Chest AP Portable (Final result)  Result time 09/06/24 18:56:51      Final result by Elizabeth Rosado MD (09/06/24 18:56:51)                   Impression:      No acute cardiopulmonary abnormality.      Electronically signed by: Elizabeth Rosado  Date:    09/06/2024  Time:    18:56               Narrative:    EXAMINATION:  XR CHEST AP PORTABLE    CLINICAL HISTORY:  Dypnea;    TECHNIQUE:  Single frontal view of the chest was performed.    COMPARISON:  None    FINDINGS:  LINES AND TUBES: EKG/telemetry leads overlie the chest.    MEDIASTINUM AND STEFAN: The cardiac silhouette is normal.    LUNGS: No lobar consolidation. No edema.    PLEURA:No pleural effusion. No pneumothorax.    BONES: No acute osseous abnormality.    OTHER: Patient is rotated to the right.                                       Medications   lactated ringers bolus 1,000 mL (1,000 mLs Intravenous New Bag 9/6/24 1931)     Medical Decision Making  The differential diagnoses includes but is not limited to:  Dehydration, electrolyte abnormality, renal dysfunction, AFib, a flutter, COPD exacerbation, decreased p.o. intake, dysphagia    Patient is ill-appearing.  Cachectic, some temporal wasting noted.  Does not communicate very much with us.  He was complaining about being called but that has about it.  Not answer my questions.  Appears ill but chronically so.  Does have a sacral wound that appears to be chronic.  Her labs today shows significant dehydration with a elevated BUN and creatinine ratio.  Her stool was soft and brown he was not tested for Hemoccult.  Per notes she was had decreased p.o. intake recently.  This is evident on her labs.   Will admit for rehydration.  We will consult Cardiology for elevated troponin although this may be secondary to demand.  We will admit.  Spoke with Matilde Hudson.  Happy to admit.      Amount and/or Complexity of Data Reviewed  External Data Reviewed: notes.     Details: Per triage, the patient's nursing home is requesting a PEG tube due to decreased oral intake.  A full history and review of symptoms is limited due to the patient's condition.    Labs: ordered. Decision-making details documented in ED Course.  Radiology: ordered and independent interpretation performed. Decision-making details documented in ED Course.  ECG/medicine tests: ordered and independent interpretation performed. Decision-making details documented in ED Course.    Risk  OTC drugs.  Prescription drug management.  Decision regarding hospitalization.            Scribe Attestation:   Scribe #1: I performed the above scribed service and the documentation accurately describes the services I performed. I attest to the accuracy of the note.    Attending Attestation:           Physician Attestation for Scribe:  Physician Attestation Statement for Scribe #1: I, Greg Gill MD, reviewed documentation, as scribed by Mart Meeks in my presence, and it is both accurate and complete.             ED Course as of 09/06/24 2019   Fri Sep 06, 2024   1804 EKG done at 5:48 p.m. shows sinus tach rate of 109 .  Left axis deviation.  No obvious ST elevation or depression. [MM]   1918 Comprehensive metabolic panel(!)  Your kidney injury with significant elevated BUN today 60.  Nine days ago 20.  Hypernatremia. [MM]   1918 Magnesium (!): 2.80 [MM]   1918 CBC auto differential(!)  Leukocytosis.  Anemia.  Leukocytosis new.  Anemia stable.  Left shift. [MM]   1918 X-Ray Chest AP Portable  Negative for acute. [MM]   1951 Troponin I(!): 0.127 [MM]   2012 Paged [facility] [MB]      ED Course User Index  [MB] Mart Meeks  [MM] Greg Gill,  MD                           Clinical Impression:  Final diagnoses:  [R00.0] Tachycardia  [E86.0] Dehydration (Primary)  [N17.9] RODERICK (acute kidney injury)  [R79.89] Elevated troponin          ED Disposition Condition    Admit Stable                Greg Gill MD  09/06/24 2019

## 2024-09-07 PROBLEM — E44.0 MODERATE MALNUTRITION: Status: ACTIVE | Noted: 2024-09-07

## 2024-09-07 LAB
ACINETOBACTER CALCOACETICUS-BAUMANNII COMPLEX (OHS): NOT DETECTED
ALBUMIN SERPL-MCNC: 1.8 G/DL (ref 3.4–4.8)
ALBUMIN/GLOB SERPL: 0.4 RATIO (ref 1.1–2)
ALP SERPL-CCNC: 78 UNIT/L (ref 40–150)
ALT SERPL-CCNC: 14 UNIT/L (ref 0–55)
ANION GAP SERPL CALC-SCNC: 7 MEQ/L
AST SERPL-CCNC: 29 UNIT/L (ref 5–34)
BACTEROIDES FRAGILIS (OHS): NOT DETECTED
BASOPHILS # BLD AUTO: 0.03 X10(3)/MCL
BASOPHILS NFR BLD AUTO: 0.3 %
BILIRUB SERPL-MCNC: 0.3 MG/DL
BNP BLD-MCNC: 122 PG/ML
BUN SERPL-MCNC: 51.4 MG/DL (ref 9.8–20.1)
C AURIS DNA BLD POS QL NAA+NON-PROBE: NOT DETECTED
C GATTII+NEOFOR DNA CSF QL NAA+NON-PROBE: NOT DETECTED
CALCIUM SERPL-MCNC: 8.6 MG/DL (ref 8.4–10.2)
CANDIDA ALBICANS (OHS): NOT DETECTED
CANDIDA GLABRATA (OHS): NOT DETECTED
CANDIDA KRUSEI (OHS): NOT DETECTED
CANDIDA PARAPSILOSIS (OHS): NOT DETECTED
CANDIDA TROPICALIS (OHS): NOT DETECTED
CHLORIDE SERPL-SCNC: 114 MMOL/L (ref 98–107)
CK SERPL-CCNC: 780 U/L (ref 29–168)
CO2 SERPL-SCNC: 23 MMOL/L (ref 23–31)
CREAT SERPL-MCNC: 0.96 MG/DL (ref 0.55–1.02)
CREAT/UREA NIT SERPL: 54
CTX-M (OHS): ABNORMAL
ENTEROBACTER CLOACAE COMPLEX (OHS): NOT DETECTED
ENTEROBACTERALES (OHS): NOT DETECTED
ENTEROCOCCUS FAECALIS (OHS): NOT DETECTED
ENTEROCOCCUS FAECIUM (OHS): NOT DETECTED
EOSINOPHIL # BLD AUTO: 0.12 X10(3)/MCL (ref 0–0.9)
EOSINOPHIL NFR BLD AUTO: 1.1 %
ERYTHROCYTE [DISTWIDTH] IN BLOOD BY AUTOMATED COUNT: 14.3 % (ref 11.5–17)
ESCHERICHIA COLI (OHS): NOT DETECTED
GFR SERPLBLD CREATININE-BSD FMLA CKD-EPI: 59 ML/MIN/1.73/M2
GLOBULIN SER-MCNC: 4.3 GM/DL (ref 2.4–3.5)
GLUCOSE SERPL-MCNC: 103 MG/DL (ref 82–115)
GP B STREP DNA CSF QL NAA+NON-PROBE: NOT DETECTED
HAEM INFLU DNA CSF QL NAA+NON-PROBE: NOT DETECTED
HCT VFR BLD AUTO: 26 % (ref 37–47)
HGB BLD-MCNC: 8.2 G/DL (ref 12–16)
IMM GRANULOCYTES # BLD AUTO: 0.09 X10(3)/MCL (ref 0–0.04)
IMM GRANULOCYTES NFR BLD AUTO: 0.8 %
IMP (OHS): ABNORMAL
KLEBSIELLA AEROGENES (OHS): NOT DETECTED
KLEBSIELLA OXYTOCA (OHS): NOT DETECTED
KLEBSIELLA PNEUMONIAE GROUP (OHS): NOT DETECTED
KPC (OHS): ABNORMAL
L MONOCYTOG DNA CSF QL NAA+NON-PROBE: NOT DETECTED
LYMPHOCYTES # BLD AUTO: 1.1 X10(3)/MCL (ref 0.6–4.6)
LYMPHOCYTES NFR BLD AUTO: 9.7 %
MCH RBC QN AUTO: 26.5 PG (ref 27–31)
MCHC RBC AUTO-ENTMCNC: 31.5 G/DL (ref 33–36)
MCR-1 (OHS): ABNORMAL
MCV RBC AUTO: 83.9 FL (ref 80–94)
MECA/C (OHS): ABNORMAL
MECA/C AND MREJ (MRSA)(OHS): ABNORMAL
MONOCYTES # BLD AUTO: 0.8 X10(3)/MCL (ref 0.1–1.3)
MONOCYTES NFR BLD AUTO: 7.1 %
N MEN DNA CSF QL NAA+NON-PROBE: NOT DETECTED
NDM (OHS): ABNORMAL
NEUTROPHILS # BLD AUTO: 9.2 X10(3)/MCL (ref 2.1–9.2)
NEUTROPHILS NFR BLD AUTO: 81 %
NRBC BLD AUTO-RTO: 0 %
OXA-48-LIKE (OHS): ABNORMAL
PHOSPHATE SERPL-MCNC: 3 MG/DL (ref 2.3–4.7)
PLATELET # BLD AUTO: 362 X10(3)/MCL (ref 130–400)
PMV BLD AUTO: 10 FL (ref 7.4–10.4)
POTASSIUM SERPL-SCNC: 3.8 MMOL/L (ref 3.5–5.1)
PROT SERPL-MCNC: 6.1 GM/DL (ref 5.8–7.6)
PROTEUS SPP. (OHS): NOT DETECTED
PSEUDOMONAS AERUGINOSA (OHS): NOT DETECTED
RBC # BLD AUTO: 3.1 X10(6)/MCL (ref 4.2–5.4)
S ENT+BONG DNA STL QL NAA+NON-PROBE: NOT DETECTED
S PNEUM DNA CSF QL NAA+NON-PROBE: NOT DETECTED
SERRATIA MARCESCENS (OHS): NOT DETECTED
SODIUM SERPL-SCNC: 144 MMOL/L (ref 136–145)
STAPHYLOCOCCUS AUREUS (OHS): NOT DETECTED
STAPHYLOCOCCUS EPIDERMIDIS (OHS): NOT DETECTED
STAPHYLOCOCCUS LUGDUNENSIS (OHS): NOT DETECTED
STAPHYLOCOCCUS SPP. (OHS): DETECTED
STENOTROPHOMONAS MALTOPHILIA (OHS): NOT DETECTED
STREPTOCOCCUS PYOGENES (GROUP A)(OHS): NOT DETECTED
STREPTOCOCCUS SPP. (OHS): NOT DETECTED
TROPONIN I SERPL-MCNC: 0.14 NG/ML (ref 0–0.04)
TROPONIN I SERPL-MCNC: 0.14 NG/ML (ref 0–0.04)
TROPONIN I SERPL-MCNC: 0.17 NG/ML (ref 0–0.04)
VANA/B (OHS): ABNORMAL
VIM (OHS): ABNORMAL
WBC # BLD AUTO: 11.34 X10(3)/MCL (ref 4.5–11.5)

## 2024-09-07 PROCEDURE — 21400001 HC TELEMETRY ROOM

## 2024-09-07 PROCEDURE — 84100 ASSAY OF PHOSPHORUS: CPT | Performed by: INTERNAL MEDICINE

## 2024-09-07 PROCEDURE — 11000001 HC ACUTE MED/SURG PRIVATE ROOM

## 2024-09-07 PROCEDURE — 82550 ASSAY OF CK (CPK): CPT | Performed by: INTERNAL MEDICINE

## 2024-09-07 PROCEDURE — 25000003 PHARM REV CODE 250: Performed by: INTERNAL MEDICINE

## 2024-09-07 PROCEDURE — 80053 COMPREHEN METABOLIC PANEL: CPT | Performed by: NURSE PRACTITIONER

## 2024-09-07 PROCEDURE — 84484 ASSAY OF TROPONIN QUANT: CPT | Performed by: NURSE PRACTITIONER

## 2024-09-07 PROCEDURE — 85025 COMPLETE CBC W/AUTO DIFF WBC: CPT | Performed by: NURSE PRACTITIONER

## 2024-09-07 PROCEDURE — 36415 COLL VENOUS BLD VENIPUNCTURE: CPT | Performed by: NURSE PRACTITIONER

## 2024-09-07 PROCEDURE — 84484 ASSAY OF TROPONIN QUANT: CPT | Performed by: INTERNAL MEDICINE

## 2024-09-07 RX ORDER — HYDRALAZINE HYDROCHLORIDE 20 MG/ML
10 INJECTION INTRAMUSCULAR; INTRAVENOUS
Status: DISCONTINUED | OUTPATIENT
Start: 2024-09-07 | End: 2024-09-07

## 2024-09-07 RX ORDER — HYDRALAZINE HYDROCHLORIDE 20 MG/ML
10 INJECTION INTRAMUSCULAR; INTRAVENOUS EVERY 6 HOURS PRN
Status: DISCONTINUED | OUTPATIENT
Start: 2024-09-07 | End: 2024-09-10 | Stop reason: HOSPADM

## 2024-09-07 RX ORDER — SODIUM CHLORIDE 9 MG/ML
INJECTION, SOLUTION INTRAVENOUS CONTINUOUS
Status: DISCONTINUED | OUTPATIENT
Start: 2024-09-07 | End: 2024-09-09

## 2024-09-07 RX ADMIN — COLLAGENASE SANTYL: 250 OINTMENT TOPICAL at 10:09

## 2024-09-07 RX ADMIN — SODIUM CHLORIDE: 9 INJECTION, SOLUTION INTRAVENOUS at 09:09

## 2024-09-07 NOTE — CONSULTS
Inpatient consult to Cardiology  Consult performed by: Karel Villegas FNP  Consult ordered by: Yamilet Oquendo FNP  Reason for consult: Elevated Troponin        [facility] - [facility] Tele[facility]    Cardiology  Consult Note    Patient Name: Elvie Vasquez  MRN: [id number]  Admission Date: 9/6/2024  Hospital Length of Stay: 1 days  Code Status: DNR   Attending Provider: Lucho South MD   Consulting Provider: PABLO Watson  Primary Care Physician: No primary care provider on file.  Principal Problem:Failure to thrive in adult    Patient information was obtained from patient, past medical records, ER records, and primary team.     Subjective:   Chief Complaint/Reason for Consult: NSTEMI    HPI:   Ms. Vasquez is a 84 year old female, unknown to [facility], who presented to the hospital from local nursing home with report of rapid breathing, and decreased oral intake. Patient is max [facility], nursing home supposedly asked for Peg Tube consideration, but patient also noted to be DNR. Patient noted to be anemic.Noted RODERICK, which improved. , , Troponin values mildly abnormal at 0.16 with trend down. Patient negative for acute Flu/COVID-19 Infection.Chest XR revealed no acute process. Patient noted to be ST Rhythm. No overt acute ischemia noted. Patient admitted to Hospital Medicine Team. [facility] consulted for cardiac evaluation.     PMH: Hypertension, Hyperlipidemia, Anxiety, Depression, Asthma, Dementia (Chronic)  PSH: None  Family History: None   Social History: Tobacco- Negative, Alcohol- Negative, Substance Abuse- Negative     Previous Cardiac Diagnostics:   No Cardiac Diagnostics on File    Review of patient's allergies indicates:   Allergen Reactions    Peanut      No current facility-administered medications on file prior to encounter.     Current Outpatient Medications on File Prior to Encounter   Medication Sig    arginine/ascorbate sod/Charissa ac (ARGINAID ORAL) Take by mouth.    ascorbic acid,  vitamin C, (VITAMIN C) 500 MG tablet Take 500 mg by mouth once daily.    atorvastatin (LIPITOR) 10 MG tablet Take 10 mg by mouth once daily.    losartan (COZAAR) 50 MG tablet Take 50 mg by mouth once daily.    megestroL (MEGACE ES) 625 mg/5 mL (125 mg/mL) Susp Take 625 mg by mouth once daily.    memantine (NAMENDA) 5 MG Tab Take 5 mg by mouth 2 (two) times daily.    mirtazapine (REMERON SOL-TAB) 15 MG disintegrating tablet Take 7.5 mg by mouth every evening.    montelukast (SINGULAIR) 10 mg tablet Take 10 mg by mouth every evening.    multivitamin with minerals tablet Take 1 tablet by mouth once daily.    risperiDONE (RISPERDAL) 0.25 MG Tab Take by mouth.    senna (SENOKOT) 8.6 mg tablet Take 2 tablets by mouth once daily.    sertraline (ZOLOFT) 25 MG tablet Take 25 mg by mouth once daily.    zinc gluconate 50 mg tablet Take 50 mg by mouth once daily.    fluticasone-salmeterol diskus inhaler 250-50 mcg Inhale 1 puff into the lungs 2 (two) times daily. Controller    protein supplement (PROMOD PROTEIN) Liqd Take by mouth.    rivastigmine (EXELON) 9.5 mg/24 hour PT24 Place 1 patch onto the skin once daily.     Review of Systems   Respiratory:  Negative for chest tightness.         Occasional SOB   Cardiovascular:  Negative for chest pain.     Objective:     Vital Signs (Most Recent):  Temp: 98.8 °F (37.1 °C) (09/07/24 0720)  Pulse: 99 (09/07/24 0720)  Resp: 20 (09/07/24 0420)  BP: (!) 159/68 (09/07/24 0720)  SpO2: 97 % (09/07/24 0720) Vital Signs (24h Range):  Temp:  [98.1 °F (36.7 °C)-99.4 °F (37.4 °C)] 98.8 °F (37.1 °C)  Pulse:  [] 99  Resp:  [18-31] 20  SpO2:  [95 %-98 %] 97 %  BP: (113-159)/(60-84) 159/68   Weight: 45.5 kg (100 lb 5 oz)  Body mass index is 17.77 kg/m².  SpO2: 97 %       Intake/Output Summary (Last 24 hours) at 9/7/2024 0742  Last data filed at 9/6/2024 2031  Gross per 24 hour   Intake 1000 ml   Output --   Net 1000 ml     Lines/Drains/Airways       Peripheral Intravenous Line  Duration                   Peripheral IV - Single Lumen 09/06/24 1826 22 G 1 1/4 in No Left Hand <1 day                  Significant Labs:   Chemistries:   Recent Labs   Lab 09/06/24 1837 09/06/24  2305 09/07/24  0416   *  --  144   K 5.1  --  3.8   *  --  114*   CO2 20*  --  23   BUN 60.9*  --  51.4*   CREATININE 1.28*  --  0.96   CALCIUM 8.3*  --  8.6   BILITOT 0.2  --  0.3   ALKPHOS 84  --  78   ALT 18  --  14   AST 41*  --  29   GLUCOSE 120*  --  103   MG 2.80*  --   --    PHOS  --   --  3.0   TROPONINI 0.127* 0.166* 0.144*        CBC/Anemia Labs: Coags:    Recent Labs   Lab 09/06/24 1837 09/07/24 0416   WBC 14.26* 11.34   HGB 9.2* 8.2*   HCT 31.1* 26.0*    362   MCV 89.1 83.9   RDW 14.4 14.3    Recent Labs   Lab 09/06/24 1837   INR 1.2   APTT 22.5*        Significant Imaging:  Imaging Results              X-Ray Chest AP Portable (Final result)  Result time 09/06/24 18:56:51      Final result by Elizabeth Rosado MD (09/06/24 18:56:51)                   Impression:      No acute cardiopulmonary abnormality.      Electronically signed by: Elizabeth Rosado  Date:    09/06/2024  Time:    18:56               Narrative:    EXAMINATION:  XR CHEST AP PORTABLE    CLINICAL HISTORY:  Dypnea;    TECHNIQUE:  Single frontal view of the chest was performed.    COMPARISON:  None    FINDINGS:  LINES AND TUBES: EKG/telemetry leads overlie the chest.    MEDIASTINUM AND STEFAN: The cardiac silhouette is normal.    LUNGS: No lobar consolidation. No edema.    PLEURA:No pleural effusion. No pneumothorax.    BONES: No acute osseous abnormality.    OTHER: Patient is rotated to the right.                                    EKG:       Telemetry:  Sinus Rhythm     Physical Exam  Vitals and nursing note reviewed.   Constitutional:       General: She is not in acute distress.     Appearance: Normal appearance. She is ill-appearing.   HENT:      Mouth/Throat:      Mouth: Mucous membranes are dry.   Cardiovascular:      Rate and  Rhythm: Normal rate and regular rhythm.   Pulmonary:      Effort: Pulmonary effort is normal. No respiratory distress.   Musculoskeletal:      Right lower leg: No edema.      Left lower leg: No edema.   Skin:     General: Skin is warm.   Neurological:      Mental Status: Mental status is at baseline.      Comments: Dementia/Answers Questions       Home Medications:   No current facility-administered medications on file prior to encounter.     Current Outpatient Medications on File Prior to Encounter   Medication Sig Dispense Refill    arginine/ascorbate sod/Charissa ac (ARGINAID ORAL) Take by mouth.      ascorbic acid, vitamin C, (VITAMIN C) 500 MG tablet Take 500 mg by mouth once daily.      atorvastatin (LIPITOR) 10 MG tablet Take 10 mg by mouth once daily.      losartan (COZAAR) 50 MG tablet Take 50 mg by mouth once daily.      megestroL (MEGACE ES) 625 mg/5 mL (125 mg/mL) Susp Take 625 mg by mouth once daily.      memantine (NAMENDA) 5 MG Tab Take 5 mg by mouth 2 (two) times daily.      mirtazapine (REMERON SOL-TAB) 15 MG disintegrating tablet Take 7.5 mg by mouth every evening.      montelukast (SINGULAIR) 10 mg tablet Take 10 mg by mouth every evening.      multivitamin with minerals tablet Take 1 tablet by mouth once daily.      risperiDONE (RISPERDAL) 0.25 MG Tab Take by mouth.      senna (SENOKOT) 8.6 mg tablet Take 2 tablets by mouth once daily.      sertraline (ZOLOFT) 25 MG tablet Take 25 mg by mouth once daily.      zinc gluconate 50 mg tablet Take 50 mg by mouth once daily.      fluticasone-salmeterol diskus inhaler 250-50 mcg Inhale 1 puff into the lungs 2 (two) times daily. Controller      protein supplement (PROMOD PROTEIN) Liqd Take by mouth.      rivastigmine (EXELON) 9.5 mg/24 hour PT24 Place 1 patch onto the skin once daily.       Current Schedule Inpatient Medications:   collagenase   Topical (Top) Daily     Continuous Infusions:   D5W   Intravenous Continuous 100 mL/hr at 09/06/24 2310 New Bag at  "09/06/24 3680     Assessment:   NSTEMI- Type II in the Setting of Acute Kidney Injury Related to Poor Oral Intake & Hypertensive Urgency    - EKG: ST with no evidence of Acute Ischemia  Hypertensive Urgency    - History of Hypertension  Sinus Tachycardia due to underlying Medical Conditions (Now SR)  Acute/Chronic Encephalopathy  Acute Kidney Injury Related to Poor Oral Intake  Hypernatremia Related to Dehydration  Dementia (Chronic)  Generalized Weakness  Anemia of Chronic Disease  Leukocytosis  Chronic Debility/Decubitus Ulcerations   No known History of GI Bleed  DNR Status    Plan:   Continue Supportive Care as per Primary Team  No plan for ischemic workup- Do not suspect ACS  Consider Goals of Care Discussion with Family. Patient is a DNR.  Conservative Management from [facility] Standpoint.   Further Recommendations as per MD Rounds    Thank you for your consult.     Karel Villegas, PABLO  Cardiology  [facility] - [facility] Tele[facility]  09/07/2024                           --------------   CARDIOLOGY ATTENDING ADDENDUM   ---------------      Cardiology Attending  I evaluated Elvie Vasquez.  The patient is a 84 y.o. female with:   Chief Complaint   Patient presents with    Hyperventilating     Presents from [facility] for tachycardia and tachypnea. Patient has no complaints. NH reports decreased oral intake and requesting a PEG tube. GCS 14, baseline.         ROS    Patient is hard of hearing.  Unable to obtain ROS.  Patient denies CP.      Scheduled Medications   collagenase   Topical (Top) Daily       Blood pressure (!) 155/65, pulse (!) 111, temperature 98.9 °F (37.2 °C), temperature source Oral, resp. rate 18, height 5' 3" (1.6 m), weight 45.5 kg (100 lb 5 oz), SpO2 97%.  PE    GEN  No acute distress  Not ill appearing  NECK  Normal carotid upstroke and volume  Supple  CV  Tachycardic  Regular rhythm  No murmur  PUL  No respiratory distress  + wheezing    ABD  No distention    LOW EXT  Contractures. "  Moves hands a little bit, but not legs   SKIN  No bruising  No rash  NEURO  Awake and alert        Labs  Last BMP BMP  Lab Results   Component Value Date     09/07/2024    K 3.8 09/07/2024     (H) 09/07/2024    CO2 23 09/07/2024    BUN 51.4 (H) 09/07/2024    CREATININE 0.96 09/07/2024    CALCIUM 8.6 09/07/2024    EGFRNORACEVR 59 09/07/2024      Last CBC     Lab Results   Component Value Date    WBC 11.34 09/07/2024    HGB 8.2 (L) 09/07/2024    HCT 26.0 (L) 09/07/2024    MCV 83.9 09/07/2024     09/07/2024           BNP    Lab Results   Component Value Date    .0 (H) 09/06/2024     Troponin   Lab Results   Component Value Date    TROPONINI 0.135 (H) 09/07/2024    TROPONINI 0.144 (H) 09/07/2024    TROPONINI 0.166 (H) 09/06/2024     Last lipids    Lab Results   Component Value Date    CHOL 120 07/23/2024    HDL 40 07/23/2024    LDL 65.00 07/23/2024    TRIG 73 07/23/2024    TOTALCHOLEST 3 07/23/2024        Echo  No results found for this or any previous visit.    Stress test  No results found for this or any previous visit.    Coronary Angiogram  No results found for this or any previous visit.    Holter Monitor  No cardiac monitor results found for the past 12 months    Assessment/Plan  I agree with the assessment and plan as outlined above  Recommended comfort care to patient's sister who was at bedside     Daryl Cardenas MD  09/07/2024  11:51 AM  Cardiologist

## 2024-09-07 NOTE — PROGRESS NOTES
[facility] Hospital Medicine Progress Note        Chief Complaint: Inpatient Follow-up     HPI: Elvie Vasquez  is a 84 y.o. female who has PMH of chronic dementia, asthma, anxiety, depression, HTN, HLD; presents to the ED via EMS sent from [facility] with reports of tachycardia tachypnea decreased appetite and oral intake.  It was reported nursing home is requesting PEG tube placement secondary to her decreased oral intake and need for nutritional support.  Of note according to her nursing home records patient is a do not resuscitate.  She can not provide any accurate historical information and no family member present at the bedside to provide any additional historical information.  Attempts to contact emergency contacts were unsuccessful.  Majority of the information is obtained from her nursing home records.  It was reported that she has been having decreased appetite and oral intake over the past several days.  No reports of any injury, trauma, or falls.  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  Requires maximum assistance for all ADLs transfers.  Lab work reviewed demonstrated WBCs 14.26, H&H 9.2/31.1, sodium 149, chloride 117, CO2 20, BUN 60.9, creatinine 1.28, magnesium 2.80; .0, troponin 0.166 will repeat values 0.144; other indices unremarkable.  Chest x-ray impression reviewed demonstrated no acute cardiopulmonary abnormality.  Initial vital signs /75 pulse 117 respirations 26 temperature 98.1° F O2 saturation 97% on room air.  Patient did receive IV hydration in the ED which her heart rate subsequently trended down and respiratory rate improved.  Patient is admitted to hospital medicine services for further management.  Therapy services have been consulted.    Interval Hx:   No acute overnight events reported.  Continues on IV fluids.  Cardiology on the case, recommended conservative management.  Palliative Medicine was  consulted.    Patient was seen and examined.  Appears confused.  Says-bye to every question asked. No family at bedside.  Chart was reviewed, afebrile, hemodynamically stable, most recent lab work was reviewed    Case was discussed with patient's nurse    Objective/physical exam:  General: Ill appearing  Chest: Clear to auscultation bilaterally  Heart: +S1, S2  Abdomen: Soft, nontender, BS +  MSK: Warm, contractures of extremities .  Skin:  Dry, see media  Neurologic: Alert, not oriented, confused, does not follow commands    VITAL SIGNS: 24 HRS MIN & MAX LAST   Temp  Min: 98.1 °F (36.7 °C)  Max: 99.4 °F (37.4 °C) 98.9 °F (37.2 °C)   BP  Min: 113/60  Max: 159/68 (!) 140/58   Pulse  Min: 98  Max: 122  103   Resp  Min: 18  Max: 31 18   SpO2  Min: 95 %  Max: 99 % 99 %     I have reviewed the following labs:  Recent Labs   Lab 09/06/24 1837 09/07/24 0416   WBC 14.26* 11.34   RBC 3.49* 3.10*   HGB 9.2* 8.2*   HCT 31.1* 26.0*   MCV 89.1 83.9   MCH 26.4* 26.5*   MCHC 29.6* 31.5*   RDW 14.4 14.3    362   MPV 9.7 10.0     Recent Labs   Lab 09/06/24 1837 09/07/24 0416   * 144   K 5.1 3.8   * 114*   CO2 20* 23   BUN 60.9* 51.4*   CREATININE 1.28* 0.96   CALCIUM 8.3* 8.6   MG 2.80*  --    ALBUMIN 2.0* 1.8*   ALKPHOS 84 78   ALT 18 14   AST 41* 29   BILITOT 0.2 0.3     Microbiology Results (last 7 days)       Procedure Component Value Units Date/Time    Blood culture #1 **CANNOT BE ORDERED STAT** [[id number]] Collected: 09/06/24 1837    Order Status: Resulted Specimen: Blood from Hand, Right Updated: 09/06/24 1843    Blood culture #2 **CANNOT BE ORDERED STAT** [[id number]] Collected: 09/06/24 1837    Order Status: Resulted Specimen: Blood from Arm, Right Updated: 09/06/24 3172             See below for Radiology    Assessment/Plan:    Advanced dementia with failure to thrive  Oropharyngeal dysphagia secondary to above  Severe dehydration/hypernatremia  Acute kidney injury-improved  Elevated troponin due to  rhabdomyolysis  Normocytic anemia  Leukocytosis possible from dehydration  Bed-bound    Past medical history- Asthma, anxiety, depression, HTN, HLD     Plan   NPO. Speech therapy was consulted.  Family requesting PEG tube, GI was consulted  Creatinine and sodium improved.  Change fluids to normal saline.  Monitor lytes and blood sugars.  Cardiology was consulted, no plans for ischemic workup, recommend conservative management.   Continue with Wound care .  Continue supportive care and appropriate home medications pending speech evaluation/PEG.  Add p.r.n. antihypertensives in the interim  Palliative Medicine was consulted.        VTE prophylaxis:  SCDs      Anticipated discharge and Disposition:   To be decided, pending GI and palliative consultations      All diagnosis and differential diagnosis have been reviewed; assessment and plan has been documented; I have personally reviewed the labs and test results that are presently available; I have reviewed the patients medication list; I have reviewed the consulting providers response and recommendations. I have reviewed or attempted to review medical records based upon their availability    All of the patient's questions have been  addressed and answered. Patient's is agreeable to the above stated plan. I will continue to monitor closely and make adjustments to medical management as needed.    Portions of this note dictated using EMR integrated voice recognition software, and may be subject to voice recognition errors not corrected at proofreading. Please contact writer for clarification if needed.   _____________________________________________________________________    Malnutrition Status:    Scheduled Med:   collagenase   Topical (Top) Daily      Continuous Infusions:   D5W   Intravenous Continuous 100 mL/hr at 09/06/24 2310 New Bag at 09/06/24 2310      PRN Meds:    Current Facility-Administered Medications:     acetaminophen, 1,000 mg, Oral, Q6H PRN     acetaminophen, 650 mg, Oral, Q4H PRN    albuterol-ipratropium, 3 mL, Nebulization, Q4H PRN    haloperidol lactate, 5 mg, Intravenous, Q6H PRN **AND** diphenhydrAMINE, 25 mg, Intravenous, Q6H PRN    hydrALAZINE, 10 mg, Intravenous, Q2H PRN    ondansetron, 4 mg, Intravenous, Q4H PRN    prochlorperazine, 5 mg, Intravenous, Q6H PRN    sodium chloride 0.9%, 10 mL, Intravenous, PRN     Radiology:  I have personally reviewed the following imaging and agree with the radiologist.     X-Ray Chest AP Portable  Narrative: EXAMINATION:  XR CHEST AP PORTABLE    CLINICAL HISTORY:  Dypnea;    TECHNIQUE:  Single frontal view of the chest was performed.    COMPARISON:  None    FINDINGS:  LINES AND TUBES: EKG/telemetry leads overlie the chest.    MEDIASTINUM AND STEFAN: The cardiac silhouette is normal.    LUNGS: No lobar consolidation. No edema.    PLEURA:No pleural effusion. No pneumothorax.    BONES: No acute osseous abnormality.    OTHER: Patient is rotated to the right.  Impression: No acute cardiopulmonary abnormality.    Electronically signed by: Elizabeth Rosado  Date:    09/06/2024  Time:    18:56      Aleisha Greer MD  Department of Hospital Medicine   [facility]   09/07/2024

## 2024-09-07 NOTE — H&P
[facility] Hospital Medicine   History & Physical Note      Patient Name: Elvie Vasquez  : 1940  MRN: [id number]  Patient Class: IP- Inpatient   Admission Date: 2024   Length of Stay: 0  Admitting Service:  Service  Attending Physician: Dr. Lucho South  PCP: No primary care provider on file.  History Source: Patient, patient's family, and/or EMR.   Face-to-Face encounter date: 2024   Code status: DNR        Screening for Social Drivers for health:  Patient screened for food insecurity, housing instability, transportation needs, utility difficulties, and interpersonal safety (select all that apply as identified as concern)  []Housing or Food  []Transportation Needs  []Utility Difficulties  []Interpersonal safety  [x]None      Chief Complaint   Hyperventilating (Presents from [facility] for tachycardia and tachypnea. Patient has no complaints. NH reports decreased oral intake and requesting a PEG tube. GCS 14, baseline.)      History of Present Illness   Elvie Vasquez  is a 84 y.o. female who has PMH of chronic dementia, asthma, anxiety, depression, HTN, HLD; presents to the ED via EMS sent from [facility] with reports of tachycardia tachypnea decreased appetite and oral intake.  It was reported nursing home is requesting PEG tube placement secondary to her decreased oral intake and need for nutritional support.  Of note according to her nursing home records patient is a do not resuscitate.  She can not provide any accurate historical information and no family member present at the bedside to provide any additional historical information.  Attempts to contact emergency contacts were unsuccessful.  Majority of the information is obtained from her nursing home records.  It was reported that she has been having decreased appetite and oral intake over the past several days.  No reports of any injury, trauma, or falls.  Patient does have contractures to lower and  upper extremities.  Requires maximum assistance for all ADLs transfers.  Lab work reviewed demonstrated WBCs 14.26, H&H 9.2/31.1, sodium 149, chloride 117, CO2 20, BUN 60.9, creatinine 1.28, magnesium 2.80; .0, troponin 0.166 will repeat values 0.144; other indices unremarkable.  Chest x-ray impression reviewed demonstrated no acute cardiopulmonary abnormality.  Initial vital signs /75 pulse 117 respirations 26 temperature 98.1° F O2 saturation 97% on room air.  Patient did receive IV hydration in the ED which her heart rate subsequently trended down and respiratory rate improved.  Patient is admitted to hospital medicine services for further management.  Therapy services have been consulted.    ROS   Unable to obtain secondary to clinical condition    Past Medical History   History reviewed. No pertinent past medical history.    Past Surgical History   History reviewed. No pertinent surgical history.    Social History     Social History     Tobacco Use    Smoking status: Never     Passive exposure: Never    Smokeless tobacco: Never   Substance Use Topics    Alcohol use: Never        Family History   Reviewed and negative    Allergies   Peanut    Home Medications   As documented  Prior to Admission medications    Medication Sig Start Date End Date Taking? Authorizing Provider   arginine/ascorbate sod/Charissa ac (ARGINAID ORAL) Take by mouth.    Provider, Historical   ascorbic acid, vitamin C, (VITAMIN C) 500 MG tablet Take 500 mg by mouth once daily.    Provider, Historical   atorvastatin (LIPITOR) 10 MG tablet Take 10 mg by mouth once daily.    Provider, Historical   fluticasone-salmeterol diskus inhaler 250-50 mcg Inhale 1 puff into the lungs 2 (two) times daily. Controller    Provider, Historical   losartan (COZAAR) 50 MG tablet Take 50 mg by mouth once daily.    Provider, Historical   megestroL (MEGACE ES) 625 mg/5 mL (125 mg/mL) Susp Take 625 mg by mouth once daily.    Provider, Historical   memantine  (NAMENDA) 5 MG Tab Take 5 mg by mouth 2 (two) times daily.    Provider, Historical   mirtazapine (REMERON SOL-TAB) 15 MG disintegrating tablet Take 7.5 mg by mouth every evening.    Provider, Historical   montelukast (SINGULAIR) 10 mg tablet Take 10 mg by mouth every evening.    Provider, Historical   multivitamin with minerals tablet Take 1 tablet by mouth once daily.    Provider, Historical   protein supplement (PROMOD PROTEIN) Liqd Take by mouth.    Provider, Historical   risperiDONE (RISPERDAL) 0.25 MG Tab Take by mouth.    Provider, Historical   rivastigmine (EXELON) 9.5 mg/24 hour PT24 Place 1 patch onto the skin once daily.    Provider, Historical   senna (SENOKOT) 8.6 mg tablet Take 2 tablets by mouth once daily.    Provider, Historical   sertraline (ZOLOFT) 25 MG tablet Take 25 mg by mouth once daily.    Provider, Historical   zinc gluconate 50 mg tablet Take 50 mg by mouth once daily.    Provider, Historical        Inpatient Medications   Scheduled Meds   heparin (porcine)  5,000 Units Subcutaneous Q12H     Continuous Infusions   lactated ringers   Intravenous Continuous         PRN Meds    Current Facility-Administered Medications:     acetaminophen, 1,000 mg, Oral, Q6H PRN    acetaminophen, 650 mg, Oral, Q4H PRN    dextrose 10%, 12.5 g, Intravenous, PRN    dextrose 10%, 25 g, Intravenous, PRN    glucagon (human recombinant), 1 mg, Intramuscular, PRN    glucose, 16 g, Oral, PRN    glucose, 24 g, Oral, PRN    naloxone, 0.02 mg, Intravenous, PRN    ondansetron, 4 mg, Intravenous, Q4H PRN    prochlorperazine, 5 mg, Intravenous, Q6H PRN    sodium chloride 0.9%, 10 mL, Intravenous, PRN    Physical Exam   Vital Signs  Temp:  [98.1 °F (36.7 °C)]   Pulse:  [102-117]   Resp:  [22-31]   BP: (113-135)/(60-75)   SpO2:  [95 %-98 %]    General:  Chronically ill-appearing cachectic thin frail elderly female nontoxic confused repeatedly saying that she was called; no family member at the bedside  HEENT: NC/AT, facial and  "temporal wasting, dry oral mucosa with peeling skin to lips  Neck:  No JVD  Chest: CTABL  CVS: Regular rhythm. Normal S1/S2, systolic murmur.  Abdomen: nondistended, normoactive BS, soft and non-tender.  MSK:  Contractures to upper and lower extremities  Skin: Warm and dry; wound noted see media                    Neuro:  Awake alert confused, intermittently follows commands  Psych: Cooperative    Labs     Recent Labs     09/06/24 1837   WBC 14.26*   RBC 3.49*   HGB 9.2*   HCT 31.1*   MCV 89.1   MCH 26.4*   MCHC 29.6*   RDW 14.4        No results for input(s): "LACTIC" in the last 72 hours.  Recent Labs     09/06/24 1837   INR 1.2   APTT 22.5*     No results for input(s): "HGBA1C", "CHOL", "TRIG", "LDL", "VLDL", "HDL" in the last 72 hours.   Recent Labs     09/06/24 1837   *   K 5.1   CO2 20*   BUN 60.9*   CREATININE 1.28*   GLUCOSE 120*   CALCIUM 8.3*   MG 2.80*   ALBUMIN 2.0*   GLOBULIN 4.5*   ALKPHOS 84   ALT 18   AST 41*   BILITOT 0.2   TSH 1.783     Recent Labs     09/06/24 1837   TROPONINI 0.127*          Microbiology Results (last 7 days)       Procedure Component Value Units Date/Time    Blood culture #1 **CANNOT BE ORDERED STAT** [[id number]] Collected: 09/06/24 1837    Order Status: Resulted Specimen: Blood from Hand, Right Updated: 09/06/24 1843    Blood culture #2 **CANNOT BE ORDERED STAT** [[id number]] Collected: 09/06/24 1837    Order Status: Resulted Specimen: Blood from Arm, Right Updated: 09/06/24 1843           Imaging     X-Ray Chest AP Portable   Final Result      No acute cardiopulmonary abnormality.         Electronically signed by: Elizabeth Rosado   Date:    09/06/2024   Time:    18:56        Assessment & Plan   ASSESSMENT:  Acute on chronic encephalopathy-with chronic dementia, versus metabolic state-POA   Hypernatremia-suspect secondary to dehydration-POA   RODERICK-suspect secondary to dehydration-POA   Anemia-chronic disease-POA   Elevated troponin-POA  Leukocytosis-suspect " stress response; no signs of acute infection at this time-POA   Chronic dementia with associated chronic confusion and cognitive deficits-POA   HTN-urgency-POA   Weakness- POA    PLAN:  IV hydration D5W  Trend out troponin levels   Consult PT OT ST services   Keep NPO status until evaluated by ST services   We will need to contact family regarding PEG tube placement  Consult palliative services  Wound care for sacral and foot wounds   Turn q.2 hours   Offload bony prominences   Home medication as deemed necessary   Repeat lab work in a.m.          -VTE Prophylaxis: SCD    I, PABLO Fernandez have reviewed and discussed the case with Dr. Lucho South. Please see the following addendum for further assessment and plan from there attending MD.  PABLO Denis   09/06/2024

## 2024-09-07 NOTE — CONSULTS
Inpatient Nutrition Assessment    Admit Date: 9/6/2024   Total duration of encounter: 1 day   Patient Age: 84 y.o.    Nutrition Recommendation/Prescription     Diet NPO ordered, advance as medically feasible  Once diet is advanced add vanilla Boost VHC BID (provides 530 kcal and 22 g protein per container)  TF recommendations provided if needed:  Recommendations: Fibersource HN @ 60 mL/hr  Kcal: 1440 kcal/day (~105% est min kcal needs)  Protein: 65 g/day (120% est min kcal needs)  Fluid: 1569 mL/day (~114% est min fluid needs)  Monitor electrolytes and replete as needed  Monitor appetite/PO intake, weight, and labs    Communication of Recommendations: reviewed with patient and reviewed with family    Nutrition Assessment     Malnutrition Assessment/Nutrition-Focused Physical Exam    Malnutrition Context: acute illness or injury (09/07/24 1555)  Malnutrition Level: moderate (09/07/24 1555)  Energy Intake (Malnutrition): less than 75% for greater than 7 days (09/07/24 1555)  Weight Loss (Malnutrition): other (see comments) (Does not meet criteria) (09/07/24 1555)  Subcutaneous Fat (Malnutrition): mild depletion (09/07/24 1555)  Orbital Region (Subcutaneous Fat Loss): mild depletion  Upper Arm Region (Subcutaneous Fat Loss): mild depletion     Muscle Mass (Malnutrition): mild depletion (09/07/24 1555)     Clavicle Bone Region (Muscle Loss): mild depletion  Clavicle and Acromion Bone Region (Muscle Loss): mild depletion  Scapular Bone Region (Muscle Loss): mild depletion              Fluid Accumulation (Malnutrition): other (see comments) (Does not meet criteria) (09/07/24 1555)        A minimum of two characteristics is recommended for diagnosis of either severe or non-severe malnutrition.    Chart Review    Reason Seen: malnutrition screening tool (MST) and physician consult for wound    Malnutrition Screening Tool Results   Have you recently lost weight without trying?: Unsure  Have you been eating poorly because of  a decreased appetite?: Yes   MST Score: 3   Diagnosis:  NSTEMI- Type II in the Setting of Acute Kidney Injury Related to Poor Oral Intake & Hypertensive Urgency    - EKG: ST with no evidence of Acute Ischemia  Hypertensive Urgency    - History of Hypertension  Sinus Tachycardia due to underlying Medical Conditions (Now SR)  Acute/Chronic Encephalopathy  Acute Kidney Injury Related to Poor Oral Intake  Hypernatremia Related to Dehydration  Dementia (Chronic)  Generalized Weakness  Anemia of Chronic Disease  Leukocytosis  Chronic Debility/Decubitus Ulcerations   No known History of GI Bleed  DNR Status    Relevant Medical History:   Elvie Vasquez  is a 84 y.o. female who has PMH of chronic dementia, asthma, anxiety, depression, HTN, HLD     Scheduled Medications:  collagenase, , Daily    Continuous Infusions:  D5W, Last Rate: 100 mL/hr at 09/06/24 2310    PRN Medications:  acetaminophen, 1,000 mg, Q6H PRN  acetaminophen, 650 mg, Q4H PRN  albuterol-ipratropium, 3 mL, Q4H PRN  haloperidol lactate, 5 mg, Q6H PRN   And  diphenhydrAMINE, 25 mg, Q6H PRN  hydrALAZINE, 10 mg, Q2H PRN  ondansetron, 4 mg, Q4H PRN  prochlorperazine, 5 mg, Q6H PRN  sodium chloride 0.9%, 10 mL, PRN    Calorie Containing IV Medications: no significant kcals from medications at this time    Recent Labs   Lab 09/06/24  1837 09/07/24  0416   * 144   K 5.1 3.8   CALCIUM 8.3* 8.6   PHOS  --  3.0   MG 2.80*  --    * 114*   CO2 20* 23   BUN 60.9* 51.4*   CREATININE 1.28* 0.96   EGFRNORACEVR 42 59   GLUCOSE 120* 103   BILITOT 0.2 0.3   ALKPHOS 84 78   ALT 18 14   AST 41* 29   ALBUMIN 2.0* 1.8*   WBC 14.26* 11.34   HGB 9.2* 8.2*   HCT 31.1* 26.0*     Nutrition Orders:  Diet NPO      Appetite/Oral Intake: NPO/NPO  Factors Affecting Nutritional Intake: NPO  Social Needs Impacting Access to Food: none identified  Food/Sabianist/Cultural Preferences: none reported  Food Allergies: peanut  Last Bowel Movement: 09/05/24  Wound(s):  none  "noted    Comments    2024: The pt's sister reports a decreased appetite/PO intake for ~3 weeks prior to admit. Pt NPO. Provided TF recommendations if needed. The sister denies N/V/D/C but reports possible chewing/swallowing difficulties. The sister reports UBW as 48.2 kg (6.4% wt loss in ~4 months, insignificant). Pt's sister agreeable to vanilla ONS once diet is advanced. Last BM noted. Will monitor.    Anthropometrics    Height: 5' 3" (160 cm), Height Method: Estimated  Last Weight: 45.5 kg (100 lb 5 oz) (24 2245), Weight Method: Bed Scale  BMI (Calculated): 17.8  BMI Classification: underweight (BMI less than 18.5)     Ideal Body Weight (IBW), Female: 115 lb     % Ideal Body Weight, Female (lb): 87.23 %                    Usual Body Weight (UBW), k.2 kg  % Usual Body Weight: 94.6     Usual Weight Provided By: family/caregiver    Wt Readings from Last 5 Encounters:   24 45.5 kg (100 lb 5 oz)     Weight Change(s) Since Admission:   2024: 45.5 kg  Wt Readings from Last 1 Encounters:   245 45.5 kg (100 lb 5 oz)   24 1730 49.9 kg (110 lb)   Admit Weight: 49.9 kg (110 lb) (24 1730), Weight Method: Estimated    Estimated Needs    Weight Used For Calorie Calculations: 45.5 kg (100 lb 5 oz)  Energy Calorie Requirements (kcal): 4327-2295 (30-35 kcal/kg)  Energy Need Method: Kcal/kg  Weight Used For Protein Calculations: 45.5 kg (100 lb 5 oz)  Protein Requirements: 54-68 (1.2-1.5 g/kg)  Fluid Requirements (mL): 1365 (1 mL/kcal)  CHO Requirement: 153-188 g (45-55% est min kcal needs)     Enteral Nutrition     Patient not receiving enteral nutrition at this time.    Parenteral Nutrition     Patient not receiving parenteral nutrition support at this time.    Evaluation of Received Nutrient Intake    Calories: not meeting estimated needs  Protein: not meeting estimated needs    Patient Education     Not applicable.    Nutrition Diagnosis     PES: Inadequate oral intake related to " acute illness as evidenced by decreased PO intake for ~3 weeks prior to admit. (new)     PES: Moderate acute disease or injury related malnutrition related to acute illness as evidenced by less than 75% needs met for greater than 7 days, mild fat depletion, and mild muscle depletion. (new)    Nutrition Interventions     Intervention(s): general/healthful diet, modified composition of enteral nutrition, modified rate of enteral nutrition, and commercial beverage    Goal: Meet greater than 80% of nutritional needs by follow-up. (new)    Nutrition Goals & Monitoring     Dietitian will monitor: energy intake, weight, electrolyte/renal panel, glucose/endocrine profile, and gastrointestinal profile  Discharge planning: too early to determine; pending clinical course  Nutrition Risk/Follow-Up: high (follow-up in 1-4 days)   Please consult if re-assessment needed sooner.

## 2024-09-07 NOTE — NURSING
Nurses Note -- 4 Eyes      9/6/2024   10:37 PM      Skin assessed during: Admit      [] No Altered Skin Integrity Present    []Prevention Measures Documented      [x] Yes- Altered Skin Integrity Present or Discovered   [x] LDA Added if Not in Epic (Describe Wound)   [x] New Altered Skin Integrity was Present on Admit and Documented in LDA   [x] Wound Image Taken    Wound Care Consulted? Yes    Attending Nurse:  Morgan FOSTER    Second RN/Staff Member:   Adeline DUNBAR

## 2024-09-07 NOTE — PT/OT/SLP PROGRESS
Orders received, chart reviewed, POC discussed with nursing.  Per nursing home records, patient on modified diet of soft solids with mildly thick liquids.  Patient sent to ER for PEG tube placement due to decreased oral intake.  SLP attempting to see patient for bedside swallow evaluation however patient refusing PO at this time.  Nursing updated.  SLP to follow, will complete evaluation as appropriate.

## 2024-09-08 LAB
ABO + RH BLD: NORMAL
ABORH RETYPE: NORMAL
ANION GAP SERPL CALC-SCNC: 5 MEQ/L
BASOPHILS # BLD AUTO: 0.02 X10(3)/MCL
BASOPHILS NFR BLD AUTO: 0.2 %
BLD PROD TYP BPU: NORMAL
BLOOD UNIT EXPIRATION DATE: NORMAL
BLOOD UNIT TYPE CODE: 6200
BUN SERPL-MCNC: 26.8 MG/DL (ref 9.8–20.1)
CALCIUM SERPL-MCNC: 8.1 MG/DL (ref 8.4–10.2)
CHLORIDE SERPL-SCNC: 114 MMOL/L (ref 98–107)
CO2 SERPL-SCNC: 21 MMOL/L (ref 23–31)
CREAT SERPL-MCNC: 0.81 MG/DL (ref 0.55–1.02)
CREAT/UREA NIT SERPL: 33
CROSSMATCH INTERPRETATION: NORMAL
DISPENSE STATUS: NORMAL
EOSINOPHIL # BLD AUTO: 0.21 X10(3)/MCL (ref 0–0.9)
EOSINOPHIL NFR BLD AUTO: 2 %
ERYTHROCYTE [DISTWIDTH] IN BLOOD BY AUTOMATED COUNT: 14 % (ref 11.5–17)
GFR SERPLBLD CREATININE-BSD FMLA CKD-EPI: >60 ML/MIN/1.73/M2
GLUCOSE SERPL-MCNC: 93 MG/DL (ref 82–115)
GROUP & RH: NORMAL
HCT VFR BLD AUTO: 24.1 % (ref 37–47)
HGB BLD-MCNC: 7.4 G/DL (ref 12–16)
IMM GRANULOCYTES # BLD AUTO: 0.07 X10(3)/MCL (ref 0–0.04)
IMM GRANULOCYTES NFR BLD AUTO: 0.7 %
INDIRECT COOMBS: NORMAL
LYMPHOCYTES # BLD AUTO: 1.2 X10(3)/MCL (ref 0.6–4.6)
LYMPHOCYTES NFR BLD AUTO: 11.7 %
MAGNESIUM SERPL-MCNC: 2.3 MG/DL (ref 1.6–2.6)
MCH RBC QN AUTO: 26.3 PG (ref 27–31)
MCHC RBC AUTO-ENTMCNC: 30.7 G/DL (ref 33–36)
MCV RBC AUTO: 85.8 FL (ref 80–94)
MONOCYTES # BLD AUTO: 0.77 X10(3)/MCL (ref 0.1–1.3)
MONOCYTES NFR BLD AUTO: 7.5 %
NEUTROPHILS # BLD AUTO: 7.99 X10(3)/MCL (ref 2.1–9.2)
NEUTROPHILS NFR BLD AUTO: 77.9 %
NRBC BLD AUTO-RTO: 0 %
PHOSPHATE SERPL-MCNC: 3 MG/DL (ref 2.3–4.7)
PLATELET # BLD AUTO: 330 X10(3)/MCL (ref 130–400)
PMV BLD AUTO: 9.5 FL (ref 7.4–10.4)
POTASSIUM SERPL-SCNC: 4.1 MMOL/L (ref 3.5–5.1)
RBC # BLD AUTO: 2.81 X10(6)/MCL (ref 4.2–5.4)
SODIUM SERPL-SCNC: 140 MMOL/L (ref 136–145)
SPECIMEN OUTDATE: NORMAL
UNIT NUMBER: NORMAL
WBC # BLD AUTO: 10.26 X10(3)/MCL (ref 4.5–11.5)

## 2024-09-08 PROCEDURE — 86901 BLOOD TYPING SEROLOGIC RH(D): CPT | Performed by: INTERNAL MEDICINE

## 2024-09-08 PROCEDURE — 80048 BASIC METABOLIC PNL TOTAL CA: CPT | Performed by: INTERNAL MEDICINE

## 2024-09-08 PROCEDURE — 36415 COLL VENOUS BLD VENIPUNCTURE: CPT | Performed by: INTERNAL MEDICINE

## 2024-09-08 PROCEDURE — P9016 RBC LEUKOCYTES REDUCED: HCPCS | Performed by: INTERNAL MEDICINE

## 2024-09-08 PROCEDURE — 85025 COMPLETE CBC W/AUTO DIFF WBC: CPT | Performed by: INTERNAL MEDICINE

## 2024-09-08 PROCEDURE — 21400001 HC TELEMETRY ROOM

## 2024-09-08 PROCEDURE — 86850 RBC ANTIBODY SCREEN: CPT | Performed by: INTERNAL MEDICINE

## 2024-09-08 PROCEDURE — 86900 BLOOD TYPING SEROLOGIC ABO: CPT | Performed by: INTERNAL MEDICINE

## 2024-09-08 PROCEDURE — 63600175 PHARM REV CODE 636 W HCPCS: Performed by: INTERNAL MEDICINE

## 2024-09-08 PROCEDURE — 25000003 PHARM REV CODE 250: Performed by: INTERNAL MEDICINE

## 2024-09-08 PROCEDURE — 86923 COMPATIBILITY TEST ELECTRIC: CPT | Performed by: INTERNAL MEDICINE

## 2024-09-08 PROCEDURE — 30233N1 TRANSFUSION OF NONAUTOLOGOUS RED BLOOD CELLS INTO PERIPHERAL VEIN, PERCUTANEOUS APPROACH: ICD-10-PCS | Performed by: INTERNAL MEDICINE

## 2024-09-08 PROCEDURE — 84100 ASSAY OF PHOSPHORUS: CPT | Performed by: INTERNAL MEDICINE

## 2024-09-08 PROCEDURE — 83735 ASSAY OF MAGNESIUM: CPT | Performed by: INTERNAL MEDICINE

## 2024-09-08 PROCEDURE — 87040 BLOOD CULTURE FOR BACTERIA: CPT | Performed by: INTERNAL MEDICINE

## 2024-09-08 PROCEDURE — 36430 TRANSFUSION BLD/BLD COMPNT: CPT

## 2024-09-08 RX ORDER — HYDROCODONE BITARTRATE AND ACETAMINOPHEN 500; 5 MG/1; MG/1
TABLET ORAL
Status: DISCONTINUED | OUTPATIENT
Start: 2024-09-08 | End: 2024-09-10 | Stop reason: HOSPADM

## 2024-09-08 RX ORDER — VANCOMYCIN HCL IN 5 % DEXTROSE 1G/250ML
1000 PLASTIC BAG, INJECTION (ML) INTRAVENOUS ONCE
Status: COMPLETED | OUTPATIENT
Start: 2024-09-08 | End: 2024-09-08

## 2024-09-08 RX ADMIN — COLLAGENASE SANTYL: 250 OINTMENT TOPICAL at 09:09

## 2024-09-08 RX ADMIN — VANCOMYCIN HYDROCHLORIDE 1000 MG: 1 INJECTION, POWDER, LYOPHILIZED, FOR SOLUTION INTRAVENOUS at 11:09

## 2024-09-08 NOTE — PROGRESS NOTES
Pharmacokinetic Initial Assessment: IV Vancomycin    Assessment/Plan:    Initiate intravenous vancomycin with loading dose of 1000 mg once followed by a maintenance dose of vancomycin 500 mg IV every 24 hours  Desired empiric serum trough concentration is 15 to 20 mcg/mL  Draw vancomycin trough level 60 min prior to fourth dose on 09/11/2024 at approximately 1100  Pharmacy will continue to follow and monitor vancomycin.      Please contact pharmacy at extension [phone] with any questions regarding this assessment.     Thank you for the consult,   Lea Shannan       Patient brief summary:  Elvie Vasquez is a 84 y.o. female initiated on antimicrobial therapy with IV Vancomycin for treatment of suspected bacteremia    Drug Allergies:   Review of patient's allergies indicates:   Allergen Reactions    Peanut        Actual Body Weight:   45.8 kg    Renal Function:   Estimated Creatinine Clearance: 38.7 mL/min (based on SCr of 0.81 mg/dL).,     Dialysis Method (if applicable):  N/A    CBC (last 72 hours):  Recent Labs   Lab Result Units 09/06/24 1837 09/07/24 0416 09/08/24 0428   WBC x10(3)/mcL 14.26* 11.34 10.26   Hgb g/dL 9.2* 8.2* 7.4*   Hct % 31.1* 26.0* 24.1*   Platelet x10(3)/mcL 371 362 330   Mono % % 6.2 7.1 7.5   Eos % % 0.4 1.1 2.0   Basophil % % 0.3 0.3 0.2       Metabolic Panel (last 72 hours):  Recent Labs   Lab Result Units 09/06/24 1837 09/06/24 1930 09/07/24 0416 09/08/24  0428   Sodium mmol/L 149*  --  144 140   Potassium mmol/L 5.1  --  3.8 4.1   Chloride mmol/L 117*  --  114* 114*   CO2 mmol/L 20*  --  23 21*   Glucose mg/dL 120*  --  103 93   Glucose, UA   --  Normal  --   --    Blood Urea Nitrogen mg/dL 60.9*  --  51.4* 26.8*   Creatinine mg/dL 1.28*  --  0.96 0.81   Albumin g/dL 2.0*  --  1.8*  --    Bilirubin Total mg/dL 0.2  --  0.3  --    ALP unit/L 84  --  78  --    AST unit/L 41*  --  29  --    ALT unit/L 18  --  14  --    Magnesium Level mg/dL 2.80*  --   --  2.30   Phosphorus Level mg/dL  --  "  --  3.0 3.0       Drug levels (last 3 results):  No results for input(s): "VANCOMYCINRA", "VANCORANDOM", "VANCOMYCINPE", "VANCOPEAK", "VANCOMYCINTR", "VANCOTROUGH" in the last 72 hours.    Microbiologic Results:  Microbiology Results (last 7 days)       Procedure Component Value Units Date/Time    Blood Culture [[id number]]     Order Status: Sent Specimen: Blood from Arm, Left     Blood Culture [[id number]]     Order Status: Sent Specimen: Blood from Arm, Right     Blood culture #2 **CANNOT BE ORDERED STAT** [[id number]]  (Abnormal) Collected: 09/06/24 1837    Order Status: Completed Specimen: Blood from Arm, Right Updated: 09/08/24 0635     GRAM STAIN Seen in gram stain of broth only      Gram Positive Cocci, probable Staphylococcus      1 of 1 Aerobic bottle positive    Blood culture #1 **CANNOT BE ORDERED STAT** [[id number]]  (Normal) Collected: 09/06/24 1837    Order Status: Completed Specimen: Blood from Hand, Right Updated: 09/07/24 2000     Blood Culture No Growth At 24 Hours    BCID2 Panel [[id number]]  (Abnormal) Collected: 09/06/24 1837    Order Status: Completed Specimen: Blood from Arm, Right Updated: 09/07/24 1822     CTX-M (ESBL ) N/A     IMP (Cabapenemase ) N/A     KPC resistance gene (Carbapenemase ) N/A     mcr-1 N/A     mecA ID N/A     Comment: Note: Antimicrobial resistance can occur via multiple mechanisms. A Not Detected result for antimicrobial resistance gene(s) does not indicate antimicrobial susceptibility. Subculturing is required for species identification and susceptibility testing of   isolates.        mecA/C and MREJ (MRSA) gene N/A     NDM (Carbapenemase ) N/A     OXA-48-like (Carbapenemase ) N/A     Phuc/B (VRE gene) N/A     VIM (Carbapenemase ) N/A     Enterococcus faecalis Not Detected     Enterococcus faecium Not Detected     Listeria monocytogenes Not Detected     Staphylococcus spp. Detected     Staphylococcus aureus Not Detected "     Staphylococcus epidermidis Not Detected     Staphylococcus lugdunensis Not Detected     Streptococcus spp. Not Detected     Streptococcus agalactiae (Group B) Not Detected     Streptococcus pneumoniae Not Detected     Streptococcus pyogenes (Group A) Not Detected     Acinetobacter calcoaceticus/baumannii complex Not Detected     Bacteroides fragilis Not Detected     Enterobacterales Not Detected     Enterobacter cloacae complex Not Detected     Escherichia coli Not Detected     Klebsiella aerogenes Not Detected     Klebsiella oxytoca Not Detected     Klebsiella pneumoniae group Not Detected     Proteus spp. Not Detected     Salmonella spp. Not Detected     Serratia marcescens Not Detected     Haemophilus influenzae Not Detected     Neisseria meningitidis Not Detected     Pseudomonas aeruginosa Not Detected     Stenotrophomonas maltophilia Not Detected     Candida albicans Not Detected     Candida auris Not Detected     Candida glabrata Not Detected     Candida krusei Not Detected     Candida parapsilosis Not Detected     Candida tropicalis Not Detected     Cryptococcus neoformans/gattii Not Detected    Narrative:      The [employer] BCID2 Panel is a multiplexed nucleic acid test intended for the use with [employer]® 2.0 or [employer] Art® [employer] Systems for the simultaneous qualitative detection and identification of multiple bacterial and yeast nucleic acids and select genetic determinants associated with antimicrobial resistance.  The [employer] BCID2 Panel test is performed directly on blood culture samples identified as positive by a continuous monitoring blood culture system.  Results are intended to be interpreted in conjunction with Gram stain results.          phar

## 2024-09-08 NOTE — PROGRESS NOTES
[facility] [address]  Wound Care    Patient Name:  Elvie Vasquez   MRN:  [id number]  Date: 9/8/2024  Diagnosis: Failure to thrive in adult    History:     History reviewed. No pertinent past medical history.    Social History     Socioeconomic History    Marital status: Unknown   Tobacco Use    Smoking status: Never     Passive exposure: Never    Smokeless tobacco: Never   Substance and Sexual Activity    Alcohol use: Never    Drug use: Never    Sexual activity: Never       Precautions:     Allergies as of 09/06/2024 - Reviewed 09/06/2024   Allergen Reaction Noted    Peanut  05/11/2021       WO Assessment Details/Treatment      09/08/24 1109   WOCN Assessment   Visit Date 09/08/24   Visit Time 1109   Consult Type New   [facility] Speciality Wound   Intervention chart review;assessed;applied;orders   Teaching on-going        Wound 09/06/24 1800 Pressure Injury Sacral spine   Date First Assessed/Time First Assessed: 09/06/24 1800   Present on Original Admission: Yes  Primary Wound Type: Pressure Injury  Location: Sacral spine   Wound Image     Pressure Injury Stage U   Dressing Appearance Intact;Moist drainage   Drainage Amount Scant   Drainage Characteristics/Odor Serous;Malodorous   Appearance Yellow;Red;Pink;Moist   Tissue loss description Full thickness   Black (%), Wound Tissue Color 0 %   Red (%), Wound Tissue Color 10 %   Yellow (%), Wound Tissue Color 90 %   Periwound Area Moist;Redness;Pink;Pale white   Wound Edges Defined;Irregular   Wound Length (cm) 3.5 cm   Wound Width (cm) 4.2 cm   Wound Depth (cm) 0.2 cm   Wound Volume (cm^3) 2.94 cm^3   Wound Surface Area (cm^2) 14.7 cm^2   Care Cleansed with:;Antimicrobial agent;Wound cleanser;Other (see comments)  (Vashe)   Dressing Applied;Sodium chloride impregnated;Gauze;Other (comment)  (Secured with medipore tape)     [facility] consulted for sacrum. Discussed plan of care with nurse Lr prior to visiting the patient. Introduced self and  explained reason for visit, patient confused but agreeable and only asked that I keep her covered because she is cold. No family at bedside. Treatment recommendations and other preventative measures put in place. Sacrum: Cleanse area with vashe, dry well. Apply vashe moistened mesalt, abd pad and cover with medipore tape BID and PRN with soilage. Nursing to continue with treatment recommendations and other preventative measures. JAIRO mattress ordered with sizewise. Answered all questions to the satisfaction of the staf. Will follow up.  09/08/2024

## 2024-09-08 NOTE — PT/OT/SLP PROGRESS
Post op instructions reviewed with patients including the use of drops. SLP attempting clinical swallow evaluation, however pt refusing PO intake. SLP to continue to follow and treat as appropriate.

## 2024-09-08 NOTE — PROGRESS NOTES
[facility] Hospital Medicine Progress Note        Chief Complaint: Inpatient Follow-up     HPI: Elvie Vasquez  is a 84 y.o. female who has PMH of chronic dementia, asthma, anxiety, depression, HTN, HLD; presents to the ED via EMS sent from [facility] with reports of tachycardia tachypnea decreased appetite and oral intake.  It was reported nursing home is requesting PEG tube placement secondary to her decreased oral intake and need for nutritional support.  Of note according to her nursing home records patient is a do not resuscitate.  She can not provide any accurate historical information and no family member present at the bedside to provide any additional historical information.  Attempts to contact emergency contacts were unsuccessful.  Majority of the information is obtained from her nursing home records.  It was reported that she has been having decreased appetite and oral intake over the past several days.  No reports of any injury, trauma, or falls.  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  Requires maximum assistance for all ADLs transfers.  Lab work reviewed demonstrated WBCs 14.26, H&H 9.2/31.1, sodium 149, chloride 117, CO2 20, BUN 60.9, creatinine 1.28, magnesium 2.80; .0, troponin 0.166 will repeat values 0.144; other indices unremarkable.  Chest x-ray impression reviewed demonstrated no acute cardiopulmonary abnormality.  Initial vital signs /75 pulse 117 respirations 26 temperature 98.1° F O2 saturation 97% on room air.  Patient did receive IV hydration in the ED which her heart rate subsequently trended down and respiratory rate improved.  Patient is admitted to hospital medicine services for further management.  Therapy services have been consulted.    Cardiology was consulted.  Recommended conservative management.  Palliative Medicine was consulted    Interval Hx:   No acute overnight events reported.  Continues on  IV fluids.  Gastroenterology was consulted for PEG    Patient was seen and examined. Chart was reviewed, afebrile, hemodynamically stable, most recent lab work was reviewed.  Hemoglobin 7.4.  Blood cultures noted to be positive    Case was discussed with patient's nurse    Objective/physical exam:  General: Ill appearing  Chest: Clear to auscultation bilaterally  Heart: +S1, S2  Abdomen: Soft, nontender, BS +  MSK: Warm, contractures of extremities .  Skin:  Dry, see media  Neurologic: Alert, not oriented, confused, does not follow commands    VITAL SIGNS: 24 HRS MIN & MAX LAST   Temp  Min: 97.8 °F (36.6 °C)  Max: 99.6 °F (37.6 °C) 98.7 °F (37.1 °C)   BP  Min: 106/55  Max: 155/65 (!) 119/46   Pulse  Min: 91  Max: 111  102   Resp  Min: 18  Max: 20 20   SpO2  Min: 96 %  Max: 99 % 96 %     I have reviewed the following labs:  Recent Labs   Lab 09/06/24 1837 09/07/24 0416 09/08/24 0428   WBC 14.26* 11.34 10.26   RBC 3.49* 3.10* 2.81*   HGB 9.2* 8.2* 7.4*   HCT 31.1* 26.0* 24.1*   MCV 89.1 83.9 85.8   MCH 26.4* 26.5* 26.3*   MCHC 29.6* 31.5* 30.7*   RDW 14.4 14.3 14.0    362 330   MPV 9.7 10.0 9.5     Recent Labs   Lab 09/06/24 1837 09/07/24 0416 09/08/24 0428   * 144 140   K 5.1 3.8 4.1   * 114* 114*   CO2 20* 23 21*   BUN 60.9* 51.4* 26.8*   CREATININE 1.28* 0.96 0.81   CALCIUM 8.3* 8.6 8.1*   MG 2.80*  --  2.30   ALBUMIN 2.0* 1.8*  --    ALKPHOS 84 78  --    ALT 18 14  --    AST 41* 29  --    BILITOT 0.2 0.3  --      Microbiology Results (last 7 days)       Procedure Component Value Units Date/Time    Blood culture #2 **CANNOT BE ORDERED STAT** [[id number]]  (Abnormal) Collected: 09/06/24 1837    Order Status: Completed Specimen: Blood from Arm, Right Updated: 09/08/24 0635     GRAM STAIN Seen in gram stain of broth only      Gram Positive Cocci, probable Staphylococcus      1 of 1 Aerobic bottle positive    Blood culture #1 **CANNOT BE ORDERED STAT** [[id number]]  (Normal) Collected: 09/06/24  1837    Order Status: Completed Specimen: Blood from Hand, Right Updated: 09/07/24 2000     Blood Culture No Growth At 24 Hours    BCID2 Panel [[id number]]  (Abnormal) Collected: 09/06/24 1837    Order Status: Completed Specimen: Blood from Arm, Right Updated: 09/07/24 1822     CTX-M (ESBL ) N/A     IMP (Cabapenemase ) N/A     KPC resistance gene (Carbapenemase ) N/A     mcr-1 N/A     mecA ID N/A     Comment: Note: Antimicrobial resistance can occur via multiple mechanisms. A Not Detected result for antimicrobial resistance gene(s) does not indicate antimicrobial susceptibility. Subculturing is required for species identification and susceptibility testing of   isolates.        mecA/C and MREJ (MRSA) gene N/A     NDM (Carbapenemase ) N/A     OXA-48-like (Carbapenemase ) N/A     Phuc/B (VRE gene) N/A     VIM (Carbapenemase ) N/A     Enterococcus faecalis Not Detected     Enterococcus faecium Not Detected     Listeria monocytogenes Not Detected     Staphylococcus spp. Detected     Staphylococcus aureus Not Detected     Staphylococcus epidermidis Not Detected     Staphylococcus lugdunensis Not Detected     Streptococcus spp. Not Detected     Streptococcus agalactiae (Group B) Not Detected     Streptococcus pneumoniae Not Detected     Streptococcus pyogenes (Group A) Not Detected     Acinetobacter calcoaceticus/baumannii complex Not Detected     Bacteroides fragilis Not Detected     Enterobacterales Not Detected     Enterobacter cloacae complex Not Detected     Escherichia coli Not Detected     Klebsiella aerogenes Not Detected     Klebsiella oxytoca Not Detected     Klebsiella pneumoniae group Not Detected     Proteus spp. Not Detected     Salmonella spp. Not Detected     Serratia marcescens Not Detected     Haemophilus influenzae Not Detected     Neisseria meningitidis Not Detected     Pseudomonas aeruginosa Not Detected     Stenotrophomonas maltophilia Not Detected      Candida albicans Not Detected     Candida auris Not Detected     Candida glabrata Not Detected     Candida krusei Not Detected     Candida parapsilosis Not Detected     Candida tropicalis Not Detected     Cryptococcus neoformans/gattii Not Detected    Narrative:      The [employer] BCID2 Panel is a multiplexed nucleic acid test intended for the use with [employer]® FilmArray® 2.0 or [employer]® FilmArray® [facility] for the simultaneous qualitative detection and identification of multiple bacterial and yeast nucleic acids and select genetic determinants associated with antimicrobial resistance.  The [employer] BCID2 Panel test is performed directly on blood culture samples identified as positive by a continuous monitoring blood culture system.  Results are intended to be interpreted in conjunction with Gram stain results.             See below for Radiology    Assessment/Plan:    Advanced dementia with failure to thrive  Oropharyngeal dysphagia secondary to above  Severe dehydration/hypernatremia  Acute kidney injury-improved  Elevated troponin due to rhabdomyolysis  Normocytic anemia  Leukocytosis possible from dehydration  Bed-bound  Bacteremia    Past medical history- Asthma, anxiety, depression, HTN, HLD     Plan   NPO. Speech therapy was consulted.  On normal saline.  Monitor lytes and blood sugars.  Family requesting PEG tube, GI was consulted  Creatinine and sodium improved.    Cardiology was consulted, no plans for ischemic workup, recommend conservative management.   Keep hemoglobin 7.5, Recommended 1 unit of blood transfusion today.  Follow up on iron panel, B12, folate.  Gm + bacteremia noted.  Could be contamination.  Repeating cultures. Initiated on vancomycin.  Follow up on sensitivities.  Monitor fever curve and WBC.  Continue with Wound care .  Continue supportive care and appropriate home medications pending speech evaluation/PEG.  Added p.r.n. antihypertensives in the interim  Palliative Medicine was  consulted.    Critical care Time Spent>35 min  Anemia requiring blood transfusion      VTE prophylaxis:  SCDs      Anticipated discharge and Disposition:   To be decided, pending GI and palliative consultations      All diagnosis and differential diagnosis have been reviewed; assessment and plan has been documented; I have personally reviewed the labs and test results that are presently available; I have reviewed the patients medication list; I have reviewed the consulting providers response and recommendations. I have reviewed or attempted to review medical records based upon their availability    All of the patient's questions have been  addressed and answered. Patient's is agreeable to the above stated plan. I will continue to monitor closely and make adjustments to medical management as needed.    Portions of this note dictated using EMR integrated voice recognition software, and may be subject to voice recognition errors not corrected at proofreading. Please contact writer for clarification if needed.   _____________________________________________________________________    Malnutrition Status:    Scheduled Med:   collagenase   Topical (Top) Daily      Continuous Infusions:   0.9% NaCl   Intravenous Continuous 50 mL/hr at 09/07/24 2151 New Bag at 09/07/24 2151      PRN Meds:    Current Facility-Administered Medications:     acetaminophen, 1,000 mg, Oral, Q6H PRN    acetaminophen, 650 mg, Oral, Q4H PRN    albuterol-ipratropium, 3 mL, Nebulization, Q4H PRN    haloperidol lactate, 5 mg, Intravenous, Q6H PRN **AND** diphenhydrAMINE, 25 mg, Intravenous, Q6H PRN    hydrALAZINE, 10 mg, Intravenous, Q6H PRN    ondansetron, 4 mg, Intravenous, Q4H PRN    prochlorperazine, 5 mg, Intravenous, Q6H PRN    sodium chloride 0.9%, 10 mL, Intravenous, PRN     Radiology:  I have personally reviewed the following imaging and agree with the radiologist.     X-Ray Chest AP Portable  Narrative: EXAMINATION:  XR CHEST AP  PORTABLE    CLINICAL HISTORY:  Dypnea;    TECHNIQUE:  Single frontal view of the chest was performed.    COMPARISON:  None    FINDINGS:  LINES AND TUBES: EKG/telemetry leads overlie the chest.    MEDIASTINUM AND STEFAN: The cardiac silhouette is normal.    LUNGS: No lobar consolidation. No edema.    PLEURA:No pleural effusion. No pneumothorax.    BONES: No acute osseous abnormality.    OTHER: Patient is rotated to the right.  Impression: No acute cardiopulmonary abnormality.    Electronically signed by: Elizabeth Rosado  Date:    09/06/2024  Time:    18:56      Aleisha Greer MD  Department of Hospital Medicine   [facility]   09/08/2024

## 2024-09-09 LAB
ANION GAP SERPL CALC-SCNC: 11 MEQ/L
BACTERIA BLD CULT: ABNORMAL
BASOPHILS # BLD AUTO: 0.04 X10(3)/MCL
BASOPHILS NFR BLD AUTO: 0.3 %
BUN SERPL-MCNC: 20.9 MG/DL (ref 9.8–20.1)
CALCIUM SERPL-MCNC: 8.7 MG/DL (ref 8.4–10.2)
CHLORIDE SERPL-SCNC: 113 MMOL/L (ref 98–107)
CO2 SERPL-SCNC: 18 MMOL/L (ref 23–31)
CREAT SERPL-MCNC: 0.87 MG/DL (ref 0.55–1.02)
CREAT/UREA NIT SERPL: 24
EOSINOPHIL # BLD AUTO: 0.11 X10(3)/MCL (ref 0–0.9)
EOSINOPHIL NFR BLD AUTO: 0.8 %
ERYTHROCYTE [DISTWIDTH] IN BLOOD BY AUTOMATED COUNT: 15.4 % (ref 11.5–17)
GFR SERPLBLD CREATININE-BSD FMLA CKD-EPI: >60 ML/MIN/1.73/M2
GLUCOSE SERPL-MCNC: 74 MG/DL (ref 82–115)
GRAM STN SPEC: ABNORMAL
HCT VFR BLD AUTO: 37.2 % (ref 37–47)
HGB BLD-MCNC: 11.9 G/DL (ref 12–16)
IMM GRANULOCYTES # BLD AUTO: 0.08 X10(3)/MCL (ref 0–0.04)
IMM GRANULOCYTES NFR BLD AUTO: 0.6 %
LYMPHOCYTES # BLD AUTO: 1.87 X10(3)/MCL (ref 0.6–4.6)
LYMPHOCYTES NFR BLD AUTO: 14.2 %
MAGNESIUM SERPL-MCNC: 2.2 MG/DL (ref 1.6–2.6)
MCH RBC QN AUTO: 26 PG (ref 27–31)
MCHC RBC AUTO-ENTMCNC: 32 G/DL (ref 33–36)
MCV RBC AUTO: 81.4 FL (ref 80–94)
MONOCYTES # BLD AUTO: 0.87 X10(3)/MCL (ref 0.1–1.3)
MONOCYTES NFR BLD AUTO: 6.6 %
NEUTROPHILS # BLD AUTO: 10.23 X10(3)/MCL (ref 2.1–9.2)
NEUTROPHILS NFR BLD AUTO: 77.5 %
NRBC BLD AUTO-RTO: 0 %
PHOSPHATE SERPL-MCNC: 3.4 MG/DL (ref 2.3–4.7)
PLATELET # BLD AUTO: 401 X10(3)/MCL (ref 130–400)
PMV BLD AUTO: 9.5 FL (ref 7.4–10.4)
POCT GLUCOSE: 75 MG/DL (ref 70–110)
POTASSIUM SERPL-SCNC: 4.3 MMOL/L (ref 3.5–5.1)
RBC # BLD AUTO: 4.57 X10(6)/MCL (ref 4.2–5.4)
SODIUM SERPL-SCNC: 142 MMOL/L (ref 136–145)
WBC # BLD AUTO: 13.2 X10(3)/MCL (ref 4.5–11.5)

## 2024-09-09 PROCEDURE — 85025 COMPLETE CBC W/AUTO DIFF WBC: CPT | Performed by: INTERNAL MEDICINE

## 2024-09-09 PROCEDURE — A9540 TC99M MAA: HCPCS | Performed by: INTERNAL MEDICINE

## 2024-09-09 PROCEDURE — 83735 ASSAY OF MAGNESIUM: CPT | Performed by: INTERNAL MEDICINE

## 2024-09-09 PROCEDURE — 25000003 PHARM REV CODE 250: Performed by: INTERNAL MEDICINE

## 2024-09-09 PROCEDURE — 21400001 HC TELEMETRY ROOM

## 2024-09-09 PROCEDURE — 80048 BASIC METABOLIC PNL TOTAL CA: CPT | Performed by: INTERNAL MEDICINE

## 2024-09-09 PROCEDURE — 36415 COLL VENOUS BLD VENIPUNCTURE: CPT | Performed by: INTERNAL MEDICINE

## 2024-09-09 PROCEDURE — 84100 ASSAY OF PHOSPHORUS: CPT | Performed by: INTERNAL MEDICINE

## 2024-09-09 PROCEDURE — A9567 TECHNETIUM TC-99M AEROSOL: HCPCS | Performed by: INTERNAL MEDICINE

## 2024-09-09 PROCEDURE — 99223 1ST HOSP IP/OBS HIGH 75: CPT | Mod: ,,, | Performed by: INTERNAL MEDICINE

## 2024-09-09 RX ORDER — IBUPROFEN 200 MG
16 TABLET ORAL
Status: DISCONTINUED | OUTPATIENT
Start: 2024-09-09 | End: 2024-09-10 | Stop reason: HOSPADM

## 2024-09-09 RX ORDER — DEXTROSE MONOHYDRATE 50 MG/ML
INJECTION, SOLUTION INTRAVENOUS CONTINUOUS
Status: ACTIVE | OUTPATIENT
Start: 2024-09-09 | End: 2024-09-10

## 2024-09-09 RX ADMIN — KIT FOR THE PREPARATION OF TECHNETIUM TC 99M ALBUMIN AGGREGATED 5.4 MILLICURIE: 2 INJECTION, POWDER, LYOPHILIZED, FOR SUSPENSION INTRAPERITONEAL; INTRAVENOUS at 10:09

## 2024-09-09 RX ADMIN — COLLAGENASE SANTYL: 250 OINTMENT TOPICAL at 09:09

## 2024-09-09 RX ADMIN — Medication 16 G: at 05:09

## 2024-09-09 NOTE — PLAN OF CARE
I called pt sister Abbie [phone] to discuss hospice choices. [facility]OC list provided and she will discuss with other family memebers. I have also reached out to [facility] to ask if they have certain hospice they collab with . Instructed sister to call me at anytime with hospice choice.     [facility] Elva Leger called and they allow any choice of hospice and they also use [facility] as a choice .

## 2024-09-09 NOTE — PROGRESS NOTES
[facility] Hospital Medicine Progress Note        Chief Complaint: Inpatient Follow-up     HPI: Elvie Vasquez  is a 84 y.o. female who has PMH of chronic dementia, asthma, anxiety, depression, HTN, HLD; presents to the ED via EMS sent from [facility] with reports of tachycardia tachypnea decreased appetite and oral intake.  It was reported nursing home is requesting PEG tube placement secondary to her decreased oral intake and need for nutritional support.  Of note according to her nursing home records patient is a do not resuscitate.  She can not provide any accurate historical information and no family member present at the bedside to provide any additional historical information.  Attempts to contact emergency contacts were unsuccessful.  Majority of the information is obtained from her nursing home records.  It was reported that she has been having decreased appetite and oral intake over the past several days.  No reports of any injury, trauma, or falls.  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  Requires maximum assistance for all ADLs transfers.  Lab work reviewed demonstrated WBCs 14.26, H&H 9.2/31.1, sodium 149, chloride 117, CO2 20, BUN 60.9, creatinine 1.28, magnesium 2.80; .0, troponin 0.166 will repeat values 0.144; other indices unremarkable.  Chest x-ray impression reviewed demonstrated no acute cardiopulmonary abnormality.      Initial vital signs /75 pulse 117 respirations 26 temperature 98.1° F O2 saturation 97% on room air.  Patient did receive IV hydration in the ED which her heart rate subsequently trended down and respiratory rate improved.  Patient is admitted to hospital medicine services for further management.  Therapy services have been consulted.    Cardiology was consulted.  Recommended conservative management.  Palliative Medicine was consulted.Gastroenterology was consulted for PEG per family's  request    Interval Hx:   No acute overnight events reported.   Patient was started on vancomycin bacteremia.  Blood cultures appeared to be contaminant.Received blood transfusion 9/8. Awaiting GI consultation for PEG tube placement.     Chart was reviewed, afebrile, hemodynamically stable, most recent lab work was reviewed.  Hemoglobin 7.4.  >11.9.      Case was discussed with patient's nurse    Objective/physical exam:  General: Ill appearing  Chest: Clear to auscultation bilaterally  Heart: +S1, S2  Abdomen: Soft, nontender, BS +  MSK: Warm, contractures of extremities .  Skin:  Dry, see media  Neurologic: Alert, not oriented, confused, does not follow commands    VITAL SIGNS: 24 HRS MIN & MAX LAST   Temp  Min: 97.8 °F (36.6 °C)  Max: 99.3 °F (37.4 °C) 98.9 °F (37.2 °C)   BP  Min: 136/64  Max: 166/78 (!) 166/78   Pulse  Min: 102  Max: 112  110   Resp  Min: 19  Max: 22 19   SpO2  Min: 95 %  Max: 100 % 100 %     I have reviewed the following labs:  Recent Labs   Lab 09/07/24 0416 09/08/24  0428 09/09/24  0616   WBC 11.34 10.26 13.20*   RBC 3.10* 2.81* 4.57   HGB 8.2* 7.4* 11.9*   HCT 26.0* 24.1* 37.2   MCV 83.9 85.8 81.4   MCH 26.5* 26.3* 26.0*   MCHC 31.5* 30.7* 32.0*   RDW 14.3 14.0 15.4    330 401*   MPV 10.0 9.5 9.5     Recent Labs   Lab 09/06/24  1837 09/07/24  0416 09/08/24  0428 09/09/24  0615   * 144 140 142   K 5.1 3.8 4.1 4.3   * 114* 114* 113*   CO2 20* 23 21* 18*   BUN 60.9* 51.4* 26.8* 20.9*   CREATININE 1.28* 0.96 0.81 0.87   CALCIUM 8.3* 8.6 8.1* 8.7   MG 2.80*  --  2.30 2.20   ALBUMIN 2.0* 1.8*  --   --    ALKPHOS 84 78  --   --    ALT 18 14  --   --    AST 41* 29  --   --    BILITOT 0.2 0.3  --   --      Microbiology Results (last 7 days)       Procedure Component Value Units Date/Time    Blood culture #2 **CANNOT BE ORDERED STAT** [[id number]]  (Abnormal)  (Susceptibility) Collected: 09/06/24 1837    Order Status: Completed Specimen: Blood from Arm, Right Updated: 09/09/24 0638      Blood Culture Staphylococcus capitis     GRAM STAIN Seen in gram stain of broth only      Gram Positive Cocci, probable Staphylococcus      1 of 1 Aerobic bottle positive    Blood culture #1 **CANNOT BE ORDERED STAT** [[id number]]  (Normal) Collected: 09/06/24 1837    Order Status: Completed Specimen: Blood from Hand, Right Updated: 09/08/24 2000     Blood Culture No Growth At 48 Hours    Blood Culture [[id number]] Collected: 09/08/24 1048    Order Status: Resulted Specimen: Blood from Arm, Right Updated: 09/08/24 1130    Blood Culture [[id number]] Collected: 09/08/24 1049    Order Status: Resulted Specimen: Blood from Arm, Left Updated: 09/08/24 1130    BCID2 Panel [[id number]]  (Abnormal) Collected: 09/06/24 1837    Order Status: Completed Specimen: Blood from Arm, Right Updated: 09/07/24 1822     CTX-M (ESBL ) N/A     IMP (Cabapenemase ) N/A     KPC resistance gene (Carbapenemase ) N/A     mcr-1 N/A     mecA ID N/A     Comment: Note: Antimicrobial resistance can occur via multiple mechanisms. A Not Detected result for antimicrobial resistance gene(s) does not indicate antimicrobial susceptibility. Subculturing is required for species identification and susceptibility testing of   isolates.        mecA/C and MREJ (MRSA) gene N/A     NDM (Carbapenemase ) N/A     OXA-48-like (Carbapenemase ) N/A     Phuc/B (VRE gene) N/A     VIM (Carbapenemase ) N/A     Enterococcus faecalis Not Detected     Enterococcus faecium Not Detected     Listeria monocytogenes Not Detected     Staphylococcus spp. Detected     Staphylococcus aureus Not Detected     Staphylococcus epidermidis Not Detected     Staphylococcus lugdunensis Not Detected     Streptococcus spp. Not Detected     Streptococcus agalactiae (Group B) Not Detected     Streptococcus pneumoniae Not Detected     Streptococcus pyogenes (Group A) Not Detected     Acinetobacter calcoaceticus/baumannii complex Not Detected      Bacteroides fragilis Not Detected     Enterobacterales Not Detected     Enterobacter cloacae complex Not Detected     Escherichia coli Not Detected     Klebsiella aerogenes Not Detected     Klebsiella oxytoca Not Detected     Klebsiella pneumoniae group Not Detected     Proteus spp. Not Detected     Salmonella spp. Not Detected     Serratia marcescens Not Detected     Haemophilus influenzae Not Detected     Neisseria meningitidis Not Detected     Pseudomonas aeruginosa Not Detected     Stenotrophomonas maltophilia Not Detected     Candida albicans Not Detected     Candida auris Not Detected     Candida glabrata Not Detected     Candida krusei Not Detected     Candida parapsilosis Not Detected     Candida tropicalis Not Detected     Cryptococcus neoformans/gattii Not Detected    Narrative:      The [employer] BCID2 Panel is a multiplexed nucleic acid test intended for the use with Incont® 2.0 or Incont® [employer] Systems for the simultaneous qualitative detection and identification of multiple bacterial and yeast nucleic acids and select genetic determinants associated with antimicrobial resistance.  The [employer] BCID2 Panel test is performed directly on blood culture samples identified as positive by a continuous monitoring blood culture system.  Results are intended to be interpreted in conjunction with Gram stain results.             See below for Radiology    Assessment/Plan:    Advanced dementia with failure to thrive  Oropharyngeal dysphagia secondary to above  Severe dehydration/hypernatremia  Acute kidney injury-improved  Elevated troponin due to rhabdomyolysis  Normocytic anemia  Leukocytosis possible from dehydration  Bed-bound  Bacteremia    Past medical history- Asthma, anxiety, depression, HTN, HLD     Plan   NPO. Speech therapy was consulted.  Monitor lytes and blood sugars.  Family requesting PEG tube, GI was consulted  Creatinine and sodium improved.    Cardiology was consulted, no  plans for ischemic workup, recommend conservative management.   Keep hemoglobin 7.5,.  Follow up on iron panel, B12, folate.  Gm + bacteremia noted.  Initiated on vancomycin.  Repeated cultures.  Appears probably contamination, DC antibiotics.   Monitor fever curve and WBC.  Repeat UA  Continue with Wound care .  Continue supportive care and appropriate home medications pending speech evaluation/PEG.  Added p.r.n. antihypertensives in the interim  Palliative Medicine was consulted.  We will follow up      VTE prophylaxis:  SCDs      Anticipated discharge and Disposition:   To be decided, pending GI and palliative consultations      All diagnosis and differential diagnosis have been reviewed; assessment and plan has been documented; I have personally reviewed the labs and test results that are presently available; I have reviewed the patients medication list; I have reviewed the consulting providers response and recommendations. I have reviewed or attempted to review medical records based upon their availability    All of the patient's questions have been  addressed and answered. Patient's is agreeable to the above stated plan. I will continue to monitor closely and make adjustments to medical management as needed.    Portions of this note dictated using EMR integrated voice recognition software, and may be subject to voice recognition errors not corrected at proofreading. Please contact writer for clarification if needed.   _____________________________________________________________________    Malnutrition Status:    Scheduled Med:   collagenase   Topical (Top) Daily    vancomycin (VANCOCIN) IV (PEDS and ADULTS)  500 mg Intravenous Q24H      Continuous Infusions:   0.9% NaCl   Intravenous Continuous 50 mL/hr at 09/07/24 2151 New Bag at 09/07/24 2151      PRN Meds:    Current Facility-Administered Medications:     0.9%  NaCl infusion (for blood administration), , Intravenous, Q24H PRN    acetaminophen, 1,000 mg,  Oral, Q6H PRN    acetaminophen, 650 mg, Oral, Q4H PRN    albuterol-ipratropium, 3 mL, Nebulization, Q4H PRN    haloperidol lactate, 5 mg, Intravenous, Q6H PRN **AND** diphenhydrAMINE, 25 mg, Intravenous, Q6H PRN    hydrALAZINE, 10 mg, Intravenous, Q6H PRN    kit prep Tc 99m-pentetic acid (aerosol), 36.7 millicurie, Inhalation, ONCE PRN    ondansetron, 4 mg, Intravenous, Q4H PRN    prochlorperazine, 5 mg, Intravenous, Q6H PRN    sodium chloride 0.9%, 10 mL, Intravenous, PRN    vancomycin - pharmacy to dose, , Intravenous, pharmacy to manage frequency     Radiology:  I have personally reviewed the following imaging and agree with the radiologist.     NM Lung Scan Ventilation Perfusion  Narrative: EXAMINATION:  NM LUNG PERFUSION IMAGING    CLINICAL HISTORY:  Pulmonary embolism (PE) suspected, high prob;    COMPARISON:  Chest radiograph earlier today    FINDINGS:  Radiopharmaceutical    5.4 mCi non-HEU Tc99m-MAA    Only perfusion emerging performed.  There is mildly heterogeneous distribution of perfusion radiotracer.  Subsegmental defect right mid lung.  No other focal perfusion defects seen.  Impression: Low probability lung scan.    Electronically signed by: Genaro Escalona  Date:    09/09/2024  Time:    10:51  X-Ray Chest 1 View  Narrative: EXAMINATION:  XR CHEST 1 VIEW    CLINICAL HISTORY:  lung scan protocol;    TECHNIQUE:  Single frontal view of the chest was performed.    COMPARISON:  Three days prior    FINDINGS:  The lungs are clear, with normal appearance of pulmonary vasculature and no pleural effusion or pneumothorax.    The cardiac silhouette is normal in size. The hilar and mediastinal contours are unremarkable.    Bones are intact.  Impression: No acute abnormality.    Electronically signed by: Jocelyn Stephens MD  Date:    09/09/2024  Time:    10:50      Aleisha Greer MD  Department of Hospital Medicine   [facility]   09/09/2024

## 2024-09-09 NOTE — PLAN OF CARE
09/09/24 1401   Discharge Assessment   Assessment Type Discharge Planning Assessment   Confirmed/corrected address, phone number and insurance Yes   Confirmed Demographics Correct on Facesheet   Source of Information health record   Reason For Admission failure to thrive   People in Home facility resident   Prior to hospitilization cognitive status: Unable to Assess   Current cognitive status:   (disoriented)   Walking or Climbing Stairs ambulation difficulty, requires equipment   Dressing/Bathing bathing difficulty, dependent   How do you get to doctors appointments? agency   Discharge Plan A Return to nursing home   Discharge Plan B Return to Nursing Home   DME Needed Upon Discharge  none     Pt is from [facility]. No fmly at bs. No contacts listed. Will get info from NH

## 2024-09-09 NOTE — CONSULTS
Patient Name: Elvie Vasquez   MRN: [id number]   Admission Date: 9/6/2024   Hospital Length of Stay: 3   Attending Provider: Aleisha Greer MD   Consulting Provider: Victorino Pollock M.D.  Reason for Consult: Goals of Care  Primary Care Physician: Unable, To Obtain     Principal Problem: Failure to thrive in adult     Patient information was obtained from relative(s) and ER records.      Final diagnoses:  [R00.0] Tachycardia  [E86.0] Dehydration (Primary)  [N17.9] RODERICK (acute kidney injury)  [R79.89] Elevated troponin     Goals of care review:    We reviewed the patient and family's understanding of the seriousness of the illness and its expected prognosis. We discussed the patient's goals of care and treatment preferences. We discussed the difference between palliative and curative medicine. We explained the differences between home health, palliative and hospice care. We clarified current code status. We identified the surrogate decision maker or health care POA. We explained the difference between a living will (advanced directive) and LaPost. We discussed the patient's chosen code status as listed above and the contents of the LaPOST. We answered all questions and we formulated a plan including recommendations for symptom management and how to best achieve goals of care.     We reviewed the patient's current clinical status with the nurse. We reviewed clinical documentation, labs and imaging.       Advance Care Planning     Date: 09/09/2024    Code Status  In light of the patients advanced and life limiting illness,I engaged the the family in a voluntary conversation about the patient's preferences for care  at the very end of life. The patient wishes to have a natural, peaceful death.  Along those lines, the patient does not wish to have CPR or other invasive treatments performed when her heart and/or breathing stops. I communicated to the family that a DNR order would be placed in her medical record to reflect  this preference and LaPOST form was completed/ updated to reflect other EOL preferences of the patient such as A. DNR, B. Comfort-focused treatments, C. No artificial nutrition by tube..    [facility]  I engaged the family in a voluntary conversation about advance care planning and we specifically addressed what the goals of care would be moving forward, in light of the patient's change in clinical status, specifically known advanced dementia, bed bound status for the past couple of months after a fall with left hip pain (no fracture), unstageable sacral wound, about 15 lb weight lose prior to admit, poor po intake. She was admitted from her home at [facility] with tachypnea and tachycardia. She was found to have hypernatremia, RODERICK and dehydration, now resolved with IVF resuscitation .      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 I contacted her sister, Abbie by phone. She is next-of-kin. The patient has no spouse or children nor other siblings. We reviewed the course of her progressive decline over the past few months. I reviewed my concern about recurring hospitalizations due to dehydration, poor po intake, wound infections. She said that We did specifically address the patient's likely prognosis, which is poor.  We explored the patient's values and preferences for future care.  The family endorses that what is most important right now is to focus on spending time at home, avoiding the hospital, symptom/pain control, quality of life, even if it means sacrificing a little time, improvement in condition but with limits to invasive therapies, and comfort and QOL     Review of hospice:  Hospice review:     I reviewed the benefits of home hospice care, including aggressive symptom-based management with the intent to avoid future hospitalizations which may increase increase the risk of having a negative effect on her quality of life. I reviewed the benefit of regular visits by an assigned  RN and 24/7 on call RN to address uncontrolled symptoms which may precipitate into a symptom crisis. This may prevent unwanted ER visits or hospitalizations ordinarily necessary to manage symptoms of an advanced illness. I also reviewed benefit of regular CNA visits and the option of  and  visitations. I explained that all medications related to her diagnosis and symptom related medications would be covered by hospice, including oxygen, a hospital bed and other durable medical equipment.     Accordingly, we have decided that the best plan to meet the patient's goals includes enrolling in hospice care    Hospice  I did explain the role for hospice care at this stage of the patient's illness, including its ability to help the patient live with the best quality of life possible.  We willbe making a hospice referral.         Artificial nutrition:  I reviewed the family's wishes (on behalf of the patient) concerning the option for or against a future placement of a PEG for the purpose of long term artificial nutrition. I reviewed the benefits and risks. At this time the family elects against PEG placement in such an event.    Symptom review:    UTO as patient is confused and refused symptom review.    Assessment and Plan:    Advanced dementia  Anorexia  Dysphagia  Adult failure to thrive  Sacral decubitus    Disposition: Return to nursing home with hospice care of choice. Consult placed to CM to assist.      History of Present Illness:     85 y/o F with h/o advanced dementia. See above note. We were consulted to review goals of care.      Active Ambulatory Problems     Diagnosis Date Noted    No Active Ambulatory Problems     Resolved Ambulatory Problems     Diagnosis Date Noted    No Resolved Ambulatory Problems     No Additional Past Medical History        History reviewed. No pertinent surgical history.     Review of patient's allergies indicates:   Allergen Reactions    Peanut           Current  Facility-Administered Medications:     0.9%  NaCl infusion (for blood administration), , Intravenous, Q24H PRN, Aleisha Greer MD    acetaminophen tablet 1,000 mg, 1,000 mg, Oral, Q6H PRN, Yared Murguia FNP    acetaminophen tablet 650 mg, 650 mg, Oral, Q4H PRN, Yared Murguia, PABLO    albuterol-ipratropium 2.5 mg-0.5 mg/3 mL nebulizer solution 3 mL, 3 mL, Nebulization, Q4H PRN, Lucho South MD, 3 mL at 09/06/24 2304    collagenase ointment, , Topical (Top), Daily, Lucho South MD, Given at 09/09/24 0900    haloperidol lactate injection 5 mg, 5 mg, Intravenous, Q6H PRN **AND** diphenhydrAMINE injection 25 mg, 25 mg, Intravenous, Q6H PRN, Lucho South MD    hydrALAZINE injection 10 mg, 10 mg, Intravenous, Q6H PRN, Aleisha Greer MD    kit prep Tc 99m-pentetic acid (aerosol) 20 mg SolR 36.7 millicurie, 36.7 millicurie, Inhalation, ONCE PRN, Aleisha Greer MD    ondansetron injection 4 mg, 4 mg, Intravenous, Q4H PRN, Yared Murguia, PABLO    prochlorperazine injection Soln 5 mg, 5 mg, Intravenous, Q6H PRN, Yared Murguia FNP    sodium chloride 0.9% flush 10 mL, 10 mL, Intravenous, PRN, Yared Murguia FNP    vancomycin - pharmacy to dose, , Intravenous, pharmacy to manage frequency, Aleisha Greer MD       Current Facility-Administered Medications:     0.9%  NaCl infusion (for blood administration), , Intravenous, Q24H PRN    acetaminophen, 1,000 mg, Oral, Q6H PRN    acetaminophen, 650 mg, Oral, Q4H PRN    albuterol-ipratropium, 3 mL, Nebulization, Q4H PRN    haloperidol lactate, 5 mg, Intravenous, Q6H PRN **AND** diphenhydrAMINE, 25 mg, Intravenous, Q6H PRN    hydrALAZINE, 10 mg, Intravenous, Q6H PRN    kit prep Tc 99m-pentetic acid (aerosol), 36.7 millicurie, Inhalation, ONCE PRN    ondansetron, 4 mg, Intravenous, Q4H PRN    prochlorperazine, 5 mg, Intravenous, Q6H PRN    sodium chloride 0.9%, 10 mL, Intravenous, PRN    vancomycin - pharmacy to dose, , Intravenous,  "pharmacy to manage frequency     History reviewed. No pertinent family history.     Review of Systems   Unable to perform ROS: Dementia          12 point review of systems conducted, negative except as stated in the history of present illness. See HPI for details.      Objective:   /63   Pulse 98   Temp 99.8 °F (37.7 °C) (Oral)   Resp 19   Ht 5' 3" (1.6 m)   Wt 46.6 kg (102 lb 11.8 oz)   SpO2 100%   BMI 18.20 kg/m²      Physical Exam  Vitals reviewed.   HENT:      Head: Normocephalic.      Right Ear: External ear normal.      Left Ear: External ear normal.      Nose: Nose normal.      Mouth/Throat:      Mouth: Mucous membranes are moist.      Pharynx: Oropharynx is clear.   Eyes:      Extraocular Movements: Extraocular movements intact.      Conjunctiva/sclera: Conjunctivae normal.      Pupils: Pupils are equal, round, and reactive to light.   Cardiovascular:      Rate and Rhythm: Normal rate and regular rhythm.      Pulses: Normal pulses.      Heart sounds: Normal heart sounds.   Pulmonary:      Effort: Pulmonary effort is normal.      Breath sounds: Normal breath sounds.   Abdominal:      General: Abdomen is flat. Bowel sounds are normal. There is no distension.      Palpations: Abdomen is soft.      Tenderness: There is no abdominal tenderness.   Musculoskeletal:      Right lower leg: No edema.      Left lower leg: No edema.      Comments: Contracted LEs   Skin:     General: Skin is warm.   Neurological:      General: No focal deficit present.      Mental Status: She is alert. She is disoriented.            Review of Symptoms  Review of Symptoms      Symptom Assessment (ESAS 0-10 Scale)  Unable to complete assessment due to Dementia         Pain Assessment in Advanced Demential Scale (PAINAD)   Breathing - Independent of vocalization:  0  Negative vocalization:  0  Facial expression:  0  Body language:  0  Consolability:  0  Total:  0    Performance Status:  30    Living Arrangements:  Lives in " nursing home    Psychosocial/Cultural:   See Palliative Psychosocial Note: Yes  Sister is surrogate  **Primary  to Follow**  Palliative Care  Consult: No    Spiritual:  F - Cecy and Belief:  Sabianism  A - Address in Care:  Will consult , per family request      Advance Care Planning   Advance Directives:   LaPOST: Yes    Do Not Resuscitate Status: Yes    Agent's Name:  Abbie Oneil   Agent's Contact Number:  [phone]    Decision Making:  Family answered questions and Patient unable to communicate due to disease severity/cognitive impairment  Goals of Care: The family endorses that what is most important right now is to focus on spending time at home, avoiding the hospital, symptom/pain control, quality of life, even if it means sacrificing a little time, improvement in condition but with limits to invasive therapies, and comfort and QOL     Accordingly, we have decided that the best plan to meet the patient's goals includes enrolling in hospice care            Caregiver burden formerly assessed: No        > 50% of 74 min of encounter was spent in chart review, face to face discussion of goals of care, symptom assessment, coordination of care and emotional support.     Victorino Pollock M.D.  Palliative Medicine  [facility]

## 2024-09-09 NOTE — PT/OT/SLP PROGRESS
SLP attempting to see pt for bedside swallow evaluation.  Upon first attempt, patient leaving floor for nuc med.  Upon second attempt, patient motioning SLP away and refusing attempts to assess.  Discussed with palliative care.  SLP to sign off due to refusals.  MD may consider long term alternate means of nutrition vs palliative care pending pt/family wishes as pt was admitted for possible feeding tube placement due to inadequate PO intake.  please reconsult as needed.

## 2024-09-09 NOTE — CONSULTS
Gastroenterology Consultation Note    Reason for Consult:  peg tube placement     PCP:   Unable, To Obtain    History of Present Illness (HPI):  84 year old female unknown to our group with pmhx chronic dementia, asthma, anxiety, depression, HTN, HLD who presented to the ED via EMS due to decrease in appetite and poor oral intake.   GI consulted for peg tube placement.     On arrival, pt has been hypertensive but otherwise stable.  Lab work reviewed demonstrated WBCs 14.26, H&H 9.2/31.1, sodium 149, chloride 117, CO2 20, BUN 60.9, creatinine 1.28, magnesium 2.80; .0, troponin 0.166 will repeat values 0.144; other indices unremarkable. Chest x-ray impression reviewed demonstrated no acute cardiopulmonary abnormality.     Cardiology consulted and recommending conservative management.     In regard to leukocytosis, Gm +bacteremia noted however mention of possibly being a contamination with repeat cultures pending.   Pt started on vancomycin.     Seen by SLP however pt refusing PO intake and unable to complete.   No blood thinner per med list.     Limited history able to be obtained from patient due to dementia at baseline.   Abbie, sister has been point of contact.  [phone]. Called with no answer.         ROS:  Review of Systems   Unable to perform ROS: Dementia       Medical History:   History reviewed. No pertinent past medical history.    Surgical History:   History reviewed. No pertinent surgical history.    Family History:   No family history on file..     Social History:   Social History     Tobacco Use    Smoking status: Never     Passive exposure: Never    Smokeless tobacco: Never   Substance Use Topics    Alcohol use: Never       Allergies:  Review of patient's allergies indicates:   Allergen Reactions    Peanut        Medications Prior to Admission   Medication Sig Dispense Refill Last Dose    arginine/ascorbate sod/Charissa ac (ARGINAID ORAL) Take by mouth.   9/5/2024    ascorbic acid, vitamin C,  "(VITAMIN C) 500 MG tablet Take 500 mg by mouth once daily.   9/6/2024    atorvastatin (LIPITOR) 10 MG tablet Take 10 mg by mouth once daily.   9/6/2024    losartan (COZAAR) 50 MG tablet Take 50 mg by mouth once daily.   9/6/2024    megestroL (MEGACE ES) 625 mg/5 mL (125 mg/mL) Susp Take 625 mg by mouth once daily.   9/6/2024    memantine (NAMENDA) 5 MG Tab Take 5 mg by mouth 2 (two) times daily.   9/6/2024    mirtazapine (REMERON SOL-TAB) 15 MG disintegrating tablet Take 7.5 mg by mouth every evening.   9/5/2024    montelukast (SINGULAIR) 10 mg tablet Take 10 mg by mouth every evening.   9/5/2024    multivitamin with minerals tablet Take 1 tablet by mouth once daily.   9/6/2024    risperiDONE (RISPERDAL) 0.25 MG Tab Take by mouth.   9/6/2024    senna (SENOKOT) 8.6 mg tablet Take 2 tablets by mouth once daily.   9/6/2024    sertraline (ZOLOFT) 25 MG tablet Take 25 mg by mouth once daily.   9/6/2024    zinc gluconate 50 mg tablet Take 50 mg by mouth once daily.   9/6/2024    fluticasone-salmeterol diskus inhaler 250-50 mcg Inhale 1 puff into the lungs 2 (two) times daily. Controller   Unknown    protein supplement (PROMOD PROTEIN) Liqd Take by mouth.   Unknown    rivastigmine (EXELON) 9.5 mg/24 hour PT24 Place 1 patch onto the skin once daily.   Unknown         Objective Findings:    Vital Signs:  /63   Pulse 98   Temp 99.8 °F (37.7 °C) (Oral)   Resp 19   Ht 5' 3" (1.6 m)   Wt 46.6 kg (102 lb 11.8 oz)   SpO2 100%   BMI 18.20 kg/m²   Body mass index is 18.2 kg/m².    Physical Exam:  Physical Exam  Constitutional:       General: She is not in acute distress.  HENT:      Head: Normocephalic and atraumatic.      Nose: Nose normal.   Eyes:      General: No scleral icterus.     Extraocular Movements: Extraocular movements intact.   Pulmonary:      Effort: Pulmonary effort is normal. No respiratory distress.      Breath sounds: No stridor.   Abdominal:      General: There is no distension.      Palpations: " Abdomen is soft.      Tenderness: There is no abdominal tenderness.      Comments: Limited physical exam due to pt positions and uncooperative. No abd scars from what I am able to see   Skin:     General: Skin is warm and dry.      Coloration: Skin is not jaundiced.   Neurological:      Mental Status: She is alert. Mental status is at baseline.      Comments: Pt with dementia at baseline         Labs:  Recent Results (from the past 24 hour(s))   ABORH RETYPE    Collection Time: 09/08/24 12:52 PM   Result Value Ref Range    ABORH Retype A POS    Basic Metabolic Panel    Collection Time: 09/09/24  6:15 AM   Result Value Ref Range    Sodium 142 136 - 145 mmol/L    Potassium 4.3 3.5 - 5.1 mmol/L    Chloride 113 (H) 98 - 107 mmol/L    CO2 18 (L) 23 - 31 mmol/L    Glucose 74 (L) 82 - 115 mg/dL    Blood Urea Nitrogen 20.9 (H) 9.8 - 20.1 mg/dL    Creatinine 0.87 0.55 - 1.02 mg/dL    BUN/Creatinine Ratio 24     Calcium 8.7 8.4 - 10.2 mg/dL    Anion Gap 11.0 mEq/L    eGFR >60 mL/min/1.73/m2   Magnesium    Collection Time: 09/09/24  6:15 AM   Result Value Ref Range    Magnesium Level 2.20 1.60 - 2.60 mg/dL   Phosphorus    Collection Time: 09/09/24  6:15 AM   Result Value Ref Range    Phosphorus Level 3.4 2.3 - 4.7 mg/dL   CBC with Differential    Collection Time: 09/09/24  6:16 AM   Result Value Ref Range    WBC 13.20 (H) 4.50 - 11.50 x10(3)/mcL    RBC 4.57 4.20 - 5.40 x10(6)/mcL    Hgb 11.9 (L) 12.0 - 16.0 g/dL    Hct 37.2 37.0 - 47.0 %    MCV 81.4 80.0 - 94.0 fL    MCH 26.0 (L) 27.0 - 31.0 pg    MCHC 32.0 (L) 33.0 - 36.0 g/dL    RDW 15.4 11.5 - 17.0 %    Platelet 401 (H) 130 - 400 x10(3)/mcL    MPV 9.5 7.4 - 10.4 fL    Neut % 77.5 %    Lymph % 14.2 %    Mono % 6.6 %    Eos % 0.8 %    Basophil % 0.3 %    Lymph # 1.87 0.6 - 4.6 x10(3)/mcL    Neut # 10.23 (H) 2.1 - 9.2 x10(3)/mcL    Mono # 0.87 0.1 - 1.3 x10(3)/mcL    Eos # 0.11 0 - 0.9 x10(3)/mcL    Baso # 0.04 <=0.2 x10(3)/mcL    IG# 0.08 (H) 0 - 0.04 x10(3)/mcL    IG% 0.6 %     NRBC% 0.0 %       NM Lung Scan Ventilation Perfusion   Final Result      Low probability lung scan.         Electronically signed by: Genaro Escalona   Date:    09/09/2024   Time:    10:51      X-Ray Chest 1 View   Final Result      No acute abnormality.         Electronically signed by: Jocelyn Stephens MD   Date:    09/09/2024   Time:    10:50      X-Ray Chest AP Portable   Final Result      No acute cardiopulmonary abnormality.         Electronically signed by: Elizabeth Rosado   Date:    09/06/2024   Time:    18:56              Assessment/Plan:  84 year old female unknown to our group with pmhx chronic dementia, asthma, anxiety, depression, HTN, HLD who presented to the ED via EMS due to decrease in appetite and poor oral intake.   GI consulted for peg tube placement.    Dementia with failure to thrive  Decrease in po intake, suspect r/t above  RODERICK- improving     Patient's family has decided on hospice care and therefore no plan for peg tube placement.     Due to this, no further recs from a GI standpoint. Please call or page with questions.     Thank you for allowing us to participate in the care of Elvie Vasquez.    CINTIA GuardadoC  Gastroenterology  [facility]

## 2024-09-10 VITALS
HEART RATE: 97 BPM | WEIGHT: 102.75 LBS | HEIGHT: 63 IN | TEMPERATURE: 99 F | OXYGEN SATURATION: 98 % | BODY MASS INDEX: 18.21 KG/M2 | RESPIRATION RATE: 18 BRPM | SYSTOLIC BLOOD PRESSURE: 123 MMHG | DIASTOLIC BLOOD PRESSURE: 62 MMHG

## 2024-09-10 LAB
ANION GAP SERPL CALC-SCNC: 9 MEQ/L
BASOPHILS # BLD AUTO: 0.04 X10(3)/MCL
BASOPHILS NFR BLD AUTO: 0.4 %
BUN SERPL-MCNC: 17.6 MG/DL (ref 9.8–20.1)
CALCIUM SERPL-MCNC: 8.8 MG/DL (ref 8.4–10.2)
CHLORIDE SERPL-SCNC: 117 MMOL/L (ref 98–107)
CO2 SERPL-SCNC: 17 MMOL/L (ref 23–31)
CREAT SERPL-MCNC: 0.85 MG/DL (ref 0.55–1.02)
CREAT/UREA NIT SERPL: 21
EOSINOPHIL # BLD AUTO: 0.11 X10(3)/MCL (ref 0–0.9)
EOSINOPHIL NFR BLD AUTO: 1 %
ERYTHROCYTE [DISTWIDTH] IN BLOOD BY AUTOMATED COUNT: 15.6 % (ref 11.5–17)
GFR SERPLBLD CREATININE-BSD FMLA CKD-EPI: >60 ML/MIN/1.73/M2
GLUCOSE SERPL-MCNC: 74 MG/DL (ref 82–115)
HCT VFR BLD AUTO: 34.9 % (ref 37–47)
HGB BLD-MCNC: 10.7 G/DL (ref 12–16)
IMM GRANULOCYTES # BLD AUTO: 0.07 X10(3)/MCL (ref 0–0.04)
IMM GRANULOCYTES NFR BLD AUTO: 0.6 %
LYMPHOCYTES # BLD AUTO: 0.88 X10(3)/MCL (ref 0.6–4.6)
LYMPHOCYTES NFR BLD AUTO: 8 %
MAGNESIUM SERPL-MCNC: 2.1 MG/DL (ref 1.6–2.6)
MCH RBC QN AUTO: 26.1 PG (ref 27–31)
MCHC RBC AUTO-ENTMCNC: 30.7 G/DL (ref 33–36)
MCV RBC AUTO: 85.1 FL (ref 80–94)
MONOCYTES # BLD AUTO: 0.88 X10(3)/MCL (ref 0.1–1.3)
MONOCYTES NFR BLD AUTO: 8 %
NEUTROPHILS # BLD AUTO: 8.99 X10(3)/MCL (ref 2.1–9.2)
NEUTROPHILS NFR BLD AUTO: 82 %
NRBC BLD AUTO-RTO: 0 %
PHOSPHATE SERPL-MCNC: 3.4 MG/DL (ref 2.3–4.7)
PLATELET # BLD AUTO: 382 X10(3)/MCL (ref 130–400)
PMV BLD AUTO: 9.6 FL (ref 7.4–10.4)
POCT GLUCOSE: 103 MG/DL (ref 70–110)
POCT GLUCOSE: 80 MG/DL (ref 70–110)
POTASSIUM SERPL-SCNC: 4.6 MMOL/L (ref 3.5–5.1)
RBC # BLD AUTO: 4.1 X10(6)/MCL (ref 4.2–5.4)
SODIUM SERPL-SCNC: 143 MMOL/L (ref 136–145)
WBC # BLD AUTO: 10.97 X10(3)/MCL (ref 4.5–11.5)

## 2024-09-10 PROCEDURE — 36415 COLL VENOUS BLD VENIPUNCTURE: CPT | Performed by: INTERNAL MEDICINE

## 2024-09-10 PROCEDURE — 85025 COMPLETE CBC W/AUTO DIFF WBC: CPT | Performed by: INTERNAL MEDICINE

## 2024-09-10 PROCEDURE — 83735 ASSAY OF MAGNESIUM: CPT | Performed by: INTERNAL MEDICINE

## 2024-09-10 PROCEDURE — 84100 ASSAY OF PHOSPHORUS: CPT | Performed by: INTERNAL MEDICINE

## 2024-09-10 PROCEDURE — 80048 BASIC METABOLIC PNL TOTAL CA: CPT | Performed by: INTERNAL MEDICINE

## 2024-09-10 RX ORDER — LOSARTAN POTASSIUM 50 MG/1
50 TABLET ORAL DAILY
Start: 2024-09-10

## 2024-09-10 NOTE — CONSULTS
Inpatient Nutrition Assessment    Admit Date: 9/6/2024   Total duration of encounter: 4 days   Patient Age: 84 y.o.    Nutrition Recommendation/Prescription     Oral diet as SLP/MD, currently Diet NPO.   [facility] pt/family wishes, pursing hospice.     Communication of Recommendations: reviewed with patient and reviewed with family    Nutrition Assessment     Malnutrition Assessment/Nutrition-Focused Physical Exam    Malnutrition Context: acute illness or injury (09/07/24 1555)  Malnutrition Level: moderate (09/07/24 1555)  Energy Intake (Malnutrition): less than 75% for greater than 7 days (09/07/24 1555)  Weight Loss (Malnutrition): other (see comments) (Does not meet criteria) (09/07/24 1555)  Subcutaneous Fat (Malnutrition): mild depletion (09/07/24 1555)  Orbital Region (Subcutaneous Fat Loss): mild depletion  Upper Arm Region (Subcutaneous Fat Loss): mild depletion     Muscle Mass (Malnutrition): mild depletion (09/07/24 1555)     Clavicle Bone Region (Muscle Loss): mild depletion  Clavicle and Acromion Bone Region (Muscle Loss): mild depletion  Scapular Bone Region (Muscle Loss): mild depletion              Fluid Accumulation (Malnutrition): other (see comments) (Does not meet criteria) (09/07/24 1555)        A minimum of two characteristics is recommended for diagnosis of either severe or non-severe malnutrition.    Chart Review    Reason Seen: malnutrition screening tool (MST) and physician consult for wound    Malnutrition Screening Tool Results   Have you recently lost weight without trying?: Unsure  Have you been eating poorly because of a decreased appetite?: Yes   MST Score: 3   Diagnosis:  NSTEMI- Type II in the Setting of Acute Kidney Injury Related to Poor Oral Intake & Hypertensive Urgency    - EKG: ST with no evidence of Acute Ischemia  Hypertensive Urgency    - History of Hypertension  Sinus Tachycardia due to underlying Medical Conditions (Now SR)  Acute/Chronic Encephalopathy  Acute Kidney Injury  Related to Poor Oral Intake  Hypernatremia Related to Dehydration  Dementia (Chronic)  Generalized Weakness  Anemia of Chronic Disease  Leukocytosis  Chronic Debility/Decubitus Ulcerations   No known History of GI Bleed  DNR Status    Relevant Medical History:   chronic dementia, asthma, anxiety, depression, HTN, HLD     Scheduled Medications:  collagenase, , Daily    Continuous Infusions:     PRN Medications:  0.9%  NaCl infusion (for blood administration), , Q24H PRN  acetaminophen, 1,000 mg, Q6H PRN  acetaminophen, 650 mg, Q4H PRN  albuterol-ipratropium, 3 mL, Q4H PRN  haloperidol lactate, 5 mg, Q6H PRN   And  diphenhydrAMINE, 25 mg, Q6H PRN  glucose, 16 g, PRN  hydrALAZINE, 10 mg, Q6H PRN  kit prep Tc 99m-pentetic acid (aerosol), 36.7 millicurie, ONCE PRN  ondansetron, 4 mg, Q4H PRN  prochlorperazine, 5 mg, Q6H PRN  sodium chloride 0.9%, 10 mL, PRN  vancomycin - pharmacy to dose, , pharmacy to manage frequency    Calorie Containing IV Medications: no significant kcals from medications at this time    Recent Labs   Lab 09/06/24  1837 09/07/24  0416 09/08/24  0428 09/09/24  0615 09/09/24  0616 09/10/24  0404   * 144 140 142  --  143   K 5.1 3.8 4.1 4.3  --  4.6   CALCIUM 8.3* 8.6 8.1* 8.7  --  8.8   PHOS  --  3.0 3.0 3.4  --  3.4   MG 2.80*  --  2.30 2.20  --  2.10   * 114* 114* 113*  --  117*   CO2 20* 23 21* 18*  --  17*   BUN 60.9* 51.4* 26.8* 20.9*  --  17.6   CREATININE 1.28* 0.96 0.81 0.87  --  0.85   EGFRNORACEVR 42 59 >60 >60  --  >60   GLUCOSE 120* 103 93 74*  --  74*   BILITOT 0.2 0.3  --   --   --   --    ALKPHOS 84 78  --   --   --   --    ALT 18 14  --   --   --   --    AST 41* 29  --   --   --   --    ALBUMIN 2.0* 1.8*  --   --   --   --    WBC 14.26* 11.34 10.26  --  13.20* 10.97   HGB 9.2* 8.2* 7.4*  --  11.9* 10.7*   HCT 31.1* 26.0* 24.1*  --  37.2 34.9*     Nutrition Orders:  Diet NPO    Appetite/Oral Intake: NPO/NPO  Factors Affecting Nutritional Intake: impaired cognitive  "status/motor control and NPO  Social Needs Impacting Access to Food: none identified  Food/Holiness/Cultural Preferences: none reported  Food Allergies: peanut  Last Bowel Movement: 24  Wound(s):     Wound 24 1800 Pressure Injury Sacral spine-Tissue loss description: Full thickness none noted    Comments    2024: The pt's sister reports a decreased appetite/PO intake for ~3 weeks prior to admit. Pt NPO. Provided TF recommendations if needed. The sister denies N/V/D/C but reports possible chewing/swallowing difficulties. The sister reports UBW as 48.2 kg (6.4% wt loss in ~4 months, insignificant). Pt's sister agreeable to vanilla ONS once diet is advanced. Last BM noted. Will monitor.  9/10/2024: Continues NPO. Pt decided to pursue hospice care. No plans for nutrition support at this time.      Anthropometrics    Height: 5' 3" (160 cm), Height Method: Estimated  Last Weight: 46.6 kg (102 lb 11.8 oz) (24 0515), Weight Method: Bed Scale  BMI (Calculated): 18.2  BMI Classification: underweight (BMI less than 18.5)     Ideal Body Weight (IBW), Female: 115 lb     % Ideal Body Weight, Female (lb): 87.23 %                    Usual Body Weight (UBW), k.2 kg  % Usual Body Weight: 94.6     Usual Weight Provided By: family/caregiver    Wt Readings from Last 5 Encounters:   24 46.6 kg (102 lb 11.8 oz)     Weight Change(s) Since Admission:   2024: 46.6kg, + 1.1kg likely related to IVF   2024: 45.5 kg  Wt Readings from Last 1 Encounters:   24 0515 46.6 kg (102 lb 11.8 oz)   24 0330 45.8 kg (100 lb 15.5 oz)   24 2245 45.5 kg (100 lb 5 oz)   24 173 49.9 kg (110 lb)   Admit Weight: 49.9 kg (110 lb) (24 173), Weight Method: Estimated    Estimated Needs    Weight Used For Calorie Calculations: 45.5 kg (100 lb 5 oz)  Energy Calorie Requirements (kcal): 2144-7637 (30-35 kcal/kg)  Energy Need Method: Kcal/kg  Weight Used For Protein Calculations: 45.5 kg (100 lb 5 " oz)  Protein Requirements: 54-68 (1.2-1.5 g/kg)  Fluid Requirements (mL): 1365 (1 mL/kcal)  CHO Requirement: 153-188 g (45-55% est min kcal needs)     Enteral Nutrition     Patient not receiving enteral nutrition at this time.    Parenteral Nutrition     Patient not receiving parenteral nutrition support at this time.    Evaluation of Received Nutrient Intake    Calories: not meeting estimated needs  Protein: not meeting estimated needs    Patient Education     Not applicable.    Nutrition Diagnosis     PES: Inadequate oral intake related to acute illness as evidenced by decreased PO intake for ~3 weeks prior to admit. (active)     PES: Moderate acute disease or injury related malnutrition related to acute illness as evidenced by less than 75% needs met for greater than 7 days, mild fat depletion, and mild muscle depletion. (active)    Nutrition Interventions     Intervention(s): collaboration with other providers    Goal: Meet greater than 80% of nutritional needs by follow-up. (goal not met)    Nutrition Goals & Monitoring     Dietitian will monitor: energy intake, weight, electrolyte/renal panel, glucose/endocrine profile, and gastrointestinal profile  Discharge planning:  pursing hospice; oral diet/intake per MD/SLP recommendations  Nutrition Risk/Follow-Up: high (follow-up in 1-4 days)   Please consult if re-assessment needed sooner.

## 2024-09-10 NOTE — PLAN OF CARE
09/10/24 1017   Final Note   Assessment Type Final Discharge Note   Anticipated Discharge Disposition [facility]   Post-Acute Status   Post-Acute Authorization Hospice   Hospice Status Set-up Complete/Auth obtained   Discharge Delays None known at this time     Pt will [facility] back to [facility] with [facility].

## 2024-09-10 NOTE — PLAN OF CARE
Abbie - sister. Lidiajoel has selected [facility] .  Referral to them via [facility]

## 2024-09-11 LAB — BACTERIA BLD CULT: NORMAL

## 2024-09-11 NOTE — DISCHARGE SUMMARY
[facility] Medicine Discharge Summary    Admit Date: 9/6/2024  Discharge Date and Time: 9/11/20244:27 PM  Admitting Physician: STEVE Team  Discharging Physician: Aleisha Greer MD.  Primary Care Physician: Unable, To Obtain  Consults: Cardiology and Gastroenterology    Discharge Diagnoses:  Advanced dementia with failure to thrive  Oropharyngeal dysphagia secondary to above  Severe dehydration/hypernatremia  Acute kidney injury-improved  Elevated troponin due to rhabdomyolysis  Normocytic anemia  Leukocytosis possible from dehydration  Bed-bound  Bacteremia,contaminant     Past medical history- Asthma, anxiety, depression, HTN, HLD     Hospital Course:   Elvie Vasquez  is a 84 y.o. female who has PMH of chronic dementia, asthma, anxiety, depression, HTN, HLD; presents to the ED via EMS sent from [facility] with reports of tachycardia tachypnea decreased appetite and oral intake.  It was reported nursing home is requesting PEG tube placement secondary to her decreased oral intake and need for nutritional support.  Of note according to her nursing home records patient is a do not resuscitate.  She can not provide any accurate historical information and no family member present at the bedside to provide any additional historical information.  Attempts to contact emergency contacts were unsuccessful.  Majority of the information is obtained from her nursing home records.  It was reported that she has been having decreased appetite and oral intake over the past several days.  No reports of any injury, trauma, or falls.  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  Requires maximum assistance for all ADLs transfers.  Lab work reviewed demonstrated WBCs 14.26, H&H 9.2/31.1, sodium 149, chloride 117, CO2 20, BUN 60.9, creatinine 1.28, magnesium 2.80; .0, troponin 0.166 will repeat values 0.144; other indices unremarkable.  Chest x-ray impression  reviewed demonstrated no acute cardiopulmonary abnormality.  Initial vital signs /75 pulse 117 respirations 26 temperature 98.1° F O2 saturation 97% on room air.  Patient did receive IV hydration in the ED which her heart rate subsequently trended down and respiratory rate improved.  Patient is admitted to hospital medicine services for further management.  Therapy services have been consulted.     Cardiology was consulted.  Recommended conservative management.  Palliative Medicine was consulted.Gastroenterology was consulted for PEG per family's request initially. Later family decided for pleasure feeding and discharge to NH on hospice .       Pt was seen and examined on the day of discharge  Vitals:  VITAL SIGNS: 24 HRS MIN & MAX LAST   No data recorded 99.3 °F (37.4 °C)   No data recorded 123/62   No data recorded  97   No data recorded 18   No data recorded 98 %       Physical Exam:  General: Ill appearing  Chest: Clear to auscultation bilaterally  Heart: +S1, S2  Abdomen: Soft, nontender, BS +  MSK: Warm, contractures of extremities .  Skin:  Dry, see media  Neurologic: Alert, not oriented, confused, does not follow commands    Procedures Performed: see full chart    Significant Diagnostic Studies: See Full reports for all details    Recent Labs   Lab 09/08/24 0428 09/09/24  0616 09/10/24  0404   WBC 10.26 13.20* 10.97   RBC 2.81* 4.57 4.10*   HGB 7.4* 11.9* 10.7*   HCT 24.1* 37.2 34.9*   MCV 85.8 81.4 85.1   MCH 26.3* 26.0* 26.1*   MCHC 30.7* 32.0* 30.7*   RDW 14.0 15.4 15.6    401* 382   MPV 9.5 9.5 9.6       Recent Labs   Lab 09/06/24  1837 09/07/24  0416 09/08/24  0428 09/09/24  0615 09/10/24  0404   * 144 140 142 143   K 5.1 3.8 4.1 4.3 4.6   * 114* 114* 113* 117*   CO2 20* 23 21* 18* 17*   BUN 60.9* 51.4* 26.8* 20.9* 17.6   CREATININE 1.28* 0.96 0.81 0.87 0.85   CALCIUM 8.3* 8.6 8.1* 8.7 8.8   MG 2.80*  --  2.30 2.20 2.10   ALBUMIN 2.0* 1.8*  --   --   --    ALKPHOS 84 78  --    --   --    ALT 18 14  --   --   --    AST 41* 29  --   --   --    BILITOT 0.2 0.3  --   --   --         Microbiology Results (last 7 days)       Procedure Component Value Units Date/Time    Blood culture #2 **CANNOT BE ORDERED STAT** [[id number]]  (Abnormal)  (Susceptibility) Collected: 09/06/24 1837    Order Status: Completed Specimen: Blood from Arm, Right Updated: 09/09/24 0638     Blood Culture Staphylococcus capitis     GRAM STAIN Seen in gram stain of broth only      Gram Positive Cocci, probable Staphylococcus      1 of 1 Aerobic bottle positive    BCID2 Panel [[id number]]  (Abnormal) Collected: 09/06/24 1837    Order Status: Completed Specimen: Blood from Arm, Right Updated: 09/07/24 1822     CTX-M (ESBL ) N/A     IMP (Cabapenemase ) N/A     KPC resistance gene (Carbapenemase ) N/A     mcr-1 N/A     mecA ID N/A     Comment: Note: Antimicrobial resistance can occur via multiple mechanisms. A Not Detected result for antimicrobial resistance gene(s) does not indicate antimicrobial susceptibility. Subculturing is required for species identification and susceptibility testing of   isolates.        mecA/C and MREJ (MRSA) gene N/A     NDM (Carbapenemase ) N/A     OXA-48-like (Carbapenemase ) N/A     Phuc/B (VRE gene) N/A     VIM (Carbapenemase ) N/A     Enterococcus faecalis Not Detected     Enterococcus faecium Not Detected     Listeria monocytogenes Not Detected     Staphylococcus spp. Detected     Staphylococcus aureus Not Detected     Staphylococcus epidermidis Not Detected     Staphylococcus lugdunensis Not Detected     Streptococcus spp. Not Detected     Streptococcus agalactiae (Group B) Not Detected     Streptococcus pneumoniae Not Detected     Streptococcus pyogenes (Group A) Not Detected     Acinetobacter calcoaceticus/baumannii complex Not Detected     Bacteroides fragilis Not Detected     Enterobacterales Not Detected     Enterobacter cloacae complex Not  Detected     Escherichia coli Not Detected     Klebsiella aerogenes Not Detected     Klebsiella oxytoca Not Detected     Klebsiella pneumoniae group Not Detected     Proteus spp. Not Detected     Salmonella spp. Not Detected     Serratia marcescens Not Detected     Haemophilus influenzae Not Detected     Neisseria meningitidis Not Detected     Pseudomonas aeruginosa Not Detected     Stenotrophomonas maltophilia Not Detected     Candida albicans Not Detected     Candida auris Not Detected     Candida glabrata Not Detected     Candida krusei Not Detected     Candida parapsilosis Not Detected     Candida tropicalis Not Detected     Cryptococcus neoformans/gattii Not Detected    Narrative:      The [employer] BCID2 Panel is a multiplexed nucleic acid test intended for the use with ugichem® 2.0 or ugichem® [employer] for the simultaneous qualitative detection and identification of multiple bacterial and yeast nucleic acids and select genetic determinants associated with antimicrobial resistance.  The [employer] BCID2 Panel test is performed directly on blood culture samples identified as positive by a continuous monitoring blood culture system.  Results are intended to be interpreted in conjunction with Gram stain results.             NM Lung Scan Ventilation Perfusion  Narrative: EXAMINATION:  NM LUNG PERFUSION IMAGING    CLINICAL HISTORY:  Pulmonary embolism (PE) suspected, high prob;    COMPARISON:  Chest radiograph earlier today    FINDINGS:  Radiopharmaceutical    5.4 mCi non-HEU Tc99m-MAA    Only perfusion emerging performed.  There is mildly heterogeneous distribution of perfusion radiotracer.  Subsegmental defect right mid lung.  No other focal perfusion defects seen.  Impression: Low probability lung scan.    Electronically signed by: Genaro Escalona  Date:    09/09/2024  Time:    10:51  X-Ray Chest 1 View  Narrative: EXAMINATION:  XR CHEST 1 VIEW    CLINICAL HISTORY:  lung scan  protocol;    TECHNIQUE:  Single frontal view of the chest was performed.    COMPARISON:  Three days prior    FINDINGS:  The lungs are clear, with normal appearance of pulmonary vasculature and no pleural effusion or pneumothorax.    The cardiac silhouette is normal in size. The hilar and mediastinal contours are unremarkable.    Bones are intact.  Impression: No acute abnormality.    Electronically signed by: Jocelyn Stephens MD  Date:    09/09/2024  Time:    10:50         Medication List        START taking these medications      collagenase ointment  Commonly known as: SANTYL  Apply topically once daily.            CHANGE how you take these medications      losartan 50 MG tablet  Commonly known as: COZAAR  Take 1 tablet (50 mg total) by mouth once daily. Avoid SBP<100  What changed: additional instructions            CONTINUE taking these medications      ARGINAID ORAL     atorvastatin 10 MG tablet  Commonly known as: LIPITOR     fluticasone-salmeterol 250-50 mcg/dose 250-50 mcg/dose diskus inhaler  Commonly known as: ADVAIR     megestroL 625 mg/5 mL (125 mg/mL) Susp  Commonly known as: MEGACE ES     memantine 5 MG Tab  Commonly known as: NAMENDA     mirtazapine 15 MG disintegrating tablet  Commonly known as: REMERON SOL-TAB     montelukast 10 mg tablet  Commonly known as: SINGULAIR     multivitamin with minerals tablet     PROMOD PROTEIN Liqd  Generic drug: protein supplement     risperiDONE 0.25 MG Tab  Commonly known as: RISPERDAL     rivastigmine 9.5 mg/24 hour Pt24  Commonly known as: EXELON     senna 8.6 mg tablet  Commonly known as: SENOKOT     sertraline 25 MG tablet  Commonly known as: ZOLOFT     VITAMIN C 500 MG tablet  Generic drug: ascorbic acid (vitamin C)     zinc gluconate 50 mg tablet               Where to Get Your Medications        These medications were sent to [facility], [place] - [address], [address]  [address], [address], [facility] [address]      Phone:  [phone]   collagenase ointment       Information about where to get these medications is not yet available    Ask your nurse or doctor about these medications  losartan 50 MG tablet          Explained in detail to the patient about the discharge plan, medications, and follow-up visits. Pt understands and agrees with the treatment plan  Discharge Disposition: Hospice/Medical Facility   Discharged Condition: stable  Diet-    Medications Per DC med rec  Activities as tolerated    For further questions contact hospitalist office    Discharge time 33 minutes    For worsening symptoms, chest pain, shortness of breath, increased abdominal pain, high grade fever, stroke or stroke like symptoms, immediately go to the nearest Emergency Room or call 911 as soon as possible.      Aleisha Cruz M.D, on 9/11/2024. at 4:27 PM.

## 2024-09-13 LAB
BACTERIA BLD CULT: NORMAL
BACTERIA BLD CULT: NORMAL